# Patient Record
Sex: FEMALE | Race: WHITE | Employment: OTHER | URBAN - METROPOLITAN AREA
[De-identification: names, ages, dates, MRNs, and addresses within clinical notes are randomized per-mention and may not be internally consistent; named-entity substitution may affect disease eponyms.]

---

## 2017-01-26 ENCOUNTER — PATIENT OUTREACH (OUTPATIENT)
Dept: FAMILY MEDICINE CLINIC | Facility: CLINIC | Age: 72
End: 2017-01-26

## 2017-03-01 RX ORDER — ATORVASTATIN CALCIUM 10 MG/1
TABLET, FILM COATED ORAL
Qty: 30 TABLET | Refills: 0 | Status: SHIPPED | OUTPATIENT
Start: 2017-03-01 | End: 2017-03-17

## 2017-03-01 NOTE — TELEPHONE ENCOUNTER
30 days of the Atorvastatin approved as pt was to come back in 6 months and that was Feb. 2017. Please call patient and have her schedule 6 month follow up with Dr. Charlette Pruitt for further refills.   thanks

## 2017-03-02 NOTE — TELEPHONE ENCOUNTER
Pt made an appt for 3/17/17 for a med refill. She was in [place] as to why she did not come in in February.

## 2017-03-17 ENCOUNTER — OFFICE VISIT (OUTPATIENT)
Dept: FAMILY MEDICINE CLINIC | Facility: CLINIC | Age: 72
End: 2017-03-17

## 2017-03-17 VITALS
WEIGHT: 149 LBS | HEIGHT: 64 IN | DIASTOLIC BLOOD PRESSURE: 74 MMHG | RESPIRATION RATE: 16 BRPM | HEART RATE: 60 BPM | BODY MASS INDEX: 25.44 KG/M2 | SYSTOLIC BLOOD PRESSURE: 110 MMHG

## 2017-03-17 DIAGNOSIS — H90.12 CONDUCTIVE HEARING LOSS OF LEFT EAR, UNSPECIFIED HEARING STATUS ON CONTRALATERAL SIDE: ICD-10-CM

## 2017-03-17 DIAGNOSIS — H90.3 SENSORY HEARING LOSS, BILATERAL: ICD-10-CM

## 2017-03-17 DIAGNOSIS — E78.00 HYPERCHOLESTEROLEMIA: Primary | ICD-10-CM

## 2017-03-17 DIAGNOSIS — Z12.31 ENCOUNTER FOR SCREENING MAMMOGRAM FOR MALIGNANT NEOPLASM OF BREAST: ICD-10-CM

## 2017-03-17 DIAGNOSIS — Z00.00 ENCOUNTER FOR MEDICARE ANNUAL WELLNESS EXAM: ICD-10-CM

## 2017-03-17 DIAGNOSIS — Z79.899 ENCOUNTER FOR LONG-TERM CURRENT USE OF MEDICATION: ICD-10-CM

## 2017-03-17 DIAGNOSIS — M81.0 SENILE OSTEOPOROSIS: ICD-10-CM

## 2017-03-17 DIAGNOSIS — F51.01 PRIMARY INSOMNIA: ICD-10-CM

## 2017-03-17 DIAGNOSIS — K57.30 DIVERTICULOSIS OF LARGE INTESTINE WITHOUT HEMORRHAGE: ICD-10-CM

## 2017-03-17 PROCEDURE — G0439 PPPS, SUBSEQ VISIT: HCPCS | Performed by: FAMILY MEDICINE

## 2017-03-17 PROCEDURE — 96160 PT-FOCUSED HLTH RISK ASSMT: CPT | Performed by: FAMILY MEDICINE

## 2017-03-17 RX ORDER — ATORVASTATIN CALCIUM 10 MG/1
10 TABLET, FILM COATED ORAL NIGHTLY
Qty: 90 TABLET | Refills: 1 | Status: SHIPPED | OUTPATIENT
Start: 2017-03-17 | End: 2017-09-14

## 2017-03-17 RX ORDER — ZOLPIDEM TARTRATE 10 MG/1
TABLET ORAL
Qty: 90 TABLET | Refills: 1 | Status: SHIPPED | OUTPATIENT
Start: 2017-03-17 | End: 2017-09-14

## 2017-03-17 NOTE — PROGRESS NOTES
Review of Systems  Physical Exam      HPI:   Darrel Martinez is a 66year old female who presents for a MA (Medicare Advantage) [facility] (Once per calendar year).            Patient Care Team: Patient Care Team:  Chase Caraballo DO as PCP - General ( Barotrauma, otic, initial encounter (4/11/2016); Sensory hearing loss, bilateral (4/11/2016); Primary insomnia (8/6/2016); Conductive hearing loss of left ear (3/17/2017); and Diverticulosis of large intestine without hemorrhage (3/17/2017).     She  has pa appearance: alert, appears stated age and cooperative  Head: Normocephalic, without obvious abnormality, atraumatic  Eyes: EOMI, PERRLA, normal conjunctiva  Throat: Moist mucous membranes, normal posterior oropharynx  Neck: no adenopathy, no JVD, supple, s Cholesterol Calc      <130 mg/dL 96 82   VLDL      5-40 mg/dL 19 14   T. CHOL/HDL RATIO      <4.44 3.05 2.57   NON HDL CHOL      <130 mg/dL 115 96         ASSESSMENT AND OTHER RELEVANT CHRONIC CONDITIONS:   Saurabh Jones is a 66year old female who pres Encounter for screening mammogram for malignant neoplasm of breast  Screening mammogram due around June 30, 2017.    9. Encounter for Medicare annual wellness exam  Patient provided handouts on women's health and prevention and living will.         Meds & R (around 9/17/2017) for Chronic Conditions and as needed.      Tressa Aragon DO, 3/17/2017     General Health     In the past six months, have you lost more than 10 pounds without trying?: 2 - No    Has your appetite been poor?: No    How does the patient two weeks)?: Not at all    Feeling down, depressed, or hopeless (over the last two weeks)?: Not at all    PHQ-2 SCORE: 0        Advance Directives     Do you have a healthcare power of ?: Yes    Do you have a living will?: Yes     Please go to [facility] Health Maintenance if applicable    Chlamydia  Annually if high risk No results found for: CHLAMYDIA No flowsheet data found.     Screening Mammogram      Mammogram Annually to 75, then as discussed Mammogram,1 Yr due on 06/30/2017 Update Health Maintenance

## 2017-03-17 NOTE — PATIENT INSTRUCTIONS
Routine Healthcare for Women   Routine checkups can find treatable problems early. For many medical problems, early treatment can help prevent more serious complications. The value of checkups and how often you have them depend mainly on your age.  Your per history of high cholesterol. Colorectal cancer test: if you are 48 or older.  Recommended tests include a yearly test for blood in the stool, called the fecal occult blood test (FOBT) or fecal immunochemical test (FIT), and one of the following tests:   s history. Many other tests are often done at routine checkups, but there is no current evidence that they are helpful as routine screening tests for healthy women. Examples of such tests are a CBC (complete blood count), thyroid tests, and urine tests.  Wh medical condition, such as diabetes. Varicella (chickenpox) if you have never had chickenpox. Zoster (shingles) vaccine: if you are 60 or older. The vaccine can help prevent shingles. It can also reduce the pain caused by shingles.    What other things Others have calcium added (fortified). It's best to get calcium from the foods you eat. But if you can't get enough, you may want to take calcium supplements. To meet your daily calcium needs, try the foods listed below.   Dairy Fish & beans Other sources Public Health. This site has a lot of good information including definitions of the different types of Advance Directives.  It also has the State forms available on it's website for anyone to review and print using their home computer and printer. (the form

## 2017-03-26 DIAGNOSIS — E78.00 HYPERCHOLESTEROLEMIA: ICD-10-CM

## 2017-03-27 RX ORDER — ATORVASTATIN CALCIUM 10 MG/1
10 TABLET, FILM COATED ORAL NIGHTLY
Qty: 90 TABLET | Refills: 1 | OUTPATIENT
Start: 2017-03-27

## 2017-03-27 NOTE — TELEPHONE ENCOUNTER
From: Jarrett Palumbo  To:  Kisha Anna DO  Sent: 3/26/2017 6:50 AM CDT  Subject: Medication Renewal Request    Original authorizing provider: DO Jarrett Malin would like a refill of the following medications:  Atorvastatin [place]

## 2017-04-03 RX ORDER — ZOLPIDEM TARTRATE 10 MG/1
TABLET ORAL
Qty: 90 TABLET | Refills: 0 | OUTPATIENT
Start: 2017-04-03

## 2017-07-25 ENCOUNTER — TELEPHONE (OUTPATIENT)
Dept: FAMILY MEDICINE CLINIC | Facility: CLINIC | Age: 72
End: 2017-07-25

## 2017-07-25 DIAGNOSIS — E78.00 HYPERCHOLESTEROLEMIA: Primary | ICD-10-CM

## 2017-07-25 NOTE — TELEPHONE ENCOUNTER
Doc, pt went to the lab today wanting to have blood work completed, there are no labs pending for her in the system. The patient was here 3/17/17 for her supervisit, she had blood work done July 2016. Please advise on any orders.

## 2017-07-26 NOTE — TELEPHONE ENCOUNTER
Pt informed that the labs are in the system and she can have them completed at her earliest convenience. Pt states that she will wait a while on having labs as she marroquin snot do will with fasting.

## 2017-09-10 DIAGNOSIS — E78.00 HYPERCHOLESTEROLEMIA: ICD-10-CM

## 2017-09-11 RX ORDER — ATORVASTATIN CALCIUM 10 MG/1
TABLET, FILM COATED ORAL
Qty: 90 TABLET | Refills: 0 | OUTPATIENT
Start: 2017-09-11

## 2017-09-14 ENCOUNTER — TELEPHONE (OUTPATIENT)
Dept: FAMILY MEDICINE CLINIC | Facility: CLINIC | Age: 72
End: 2017-09-14

## 2017-09-14 DIAGNOSIS — F51.01 PRIMARY INSOMNIA: ICD-10-CM

## 2017-09-14 DIAGNOSIS — E78.00 HYPERCHOLESTEROLEMIA: ICD-10-CM

## 2017-09-14 RX ORDER — ATORVASTATIN CALCIUM 10 MG/1
10 TABLET, FILM COATED ORAL NIGHTLY
Qty: 30 TABLET | Refills: 0 | Status: SHIPPED | OUTPATIENT
Start: 2017-09-14 | End: 2017-09-28

## 2017-09-14 RX ORDER — ATORVASTATIN CALCIUM 10 MG/1
10 TABLET, FILM COATED ORAL NIGHTLY
Qty: 90 TABLET | Refills: 1
Start: 2017-09-14

## 2017-09-14 RX ORDER — ZOLPIDEM TARTRATE 10 MG/1
TABLET ORAL
Qty: 90 TABLET | Refills: 1
Start: 2017-09-14

## 2017-09-14 NOTE — TELEPHONE ENCOUNTER
Pt is due for an office visit last seen 3/2017. Will approve 30 day refill.   Spoke to patient's  and he will discuss with patient and have her call office to schedule appt

## 2017-09-14 NOTE — TELEPHONE ENCOUNTER
The Zolpidem script is pending in a separate TE for Dr. Anel Escobar. It will be for 30 days once it is approved. The Atorvastatin was sent thru this TE for 30 days.

## 2017-09-14 NOTE — TELEPHONE ENCOUNTER
Pt called and said she will be out of her meds by the time her appt comes around on 9/28/17 @11:30, she wants to know if she can get enough to hold her over until her appt. She needs Atorvastatin and Zolpidum.

## 2017-09-15 RX ORDER — ZOLPIDEM TARTRATE 10 MG/1
TABLET ORAL
Qty: 30 TABLET | Refills: 0 | Status: SHIPPED | OUTPATIENT
Start: 2017-09-15 | End: 2017-09-28

## 2017-09-18 ENCOUNTER — TELEPHONE (OUTPATIENT)
Dept: FAMILY MEDICINE CLINIC | Facility: CLINIC | Age: 72
End: 2017-09-18

## 2017-09-18 NOTE — TELEPHONE ENCOUNTER
Ricky Pelletier is calling regarding her ZOLPIDEM TARTRATE 10 MG Oral Tab the pharmacy instructed her to call the office.  Please call her at [phone]

## 2017-09-21 NOTE — TELEPHONE ENCOUNTER
Called pharmacy to see if med was approved and [facility] said that patient already picked up the script yesterday and only had to pay $5.38    Letter received from [facility] that zolpidem was approved from 7/16/17 thru 3/28/9673  Cert.  Number  [id number]

## 2017-09-28 ENCOUNTER — APPOINTMENT (OUTPATIENT)
Dept: LAB | Age: 72
End: 2017-09-28
Attending: FAMILY MEDICINE
Payer: MEDICARE

## 2017-09-28 ENCOUNTER — OFFICE VISIT (OUTPATIENT)
Dept: FAMILY MEDICINE CLINIC | Facility: CLINIC | Age: 72
End: 2017-09-28

## 2017-09-28 VITALS
DIASTOLIC BLOOD PRESSURE: 68 MMHG | WEIGHT: 149.38 LBS | RESPIRATION RATE: 16 BRPM | BODY MASS INDEX: 25.5 KG/M2 | SYSTOLIC BLOOD PRESSURE: 112 MMHG | HEIGHT: 64 IN | HEART RATE: 57 BPM

## 2017-09-28 DIAGNOSIS — F51.01 PRIMARY INSOMNIA: ICD-10-CM

## 2017-09-28 DIAGNOSIS — E78.00 HYPERCHOLESTEROLEMIA: Primary | ICD-10-CM

## 2017-09-28 DIAGNOSIS — E78.00 HYPERCHOLESTEROLEMIA: ICD-10-CM

## 2017-09-28 LAB
ALBUMIN SERPL-MCNC: 3.9 G/DL (ref 3.5–4.8)
ALP LIVER SERPL-CCNC: 98 U/L (ref 55–142)
ALT SERPL-CCNC: 21 U/L (ref 14–54)
AST SERPL-CCNC: 21 U/L (ref 15–41)
BILIRUB SERPL-MCNC: 0.8 MG/DL (ref 0.1–2)
BUN BLD-MCNC: 19 MG/DL (ref 8–20)
CALCIUM BLD-MCNC: 9.3 MG/DL (ref 8.3–10.3)
CHLORIDE: 107 MMOL/L (ref 101–111)
CHOLEST SMN-MCNC: 161 MG/DL (ref ?–200)
CO2: 27 MMOL/L (ref 22–32)
CREAT BLD-MCNC: 0.84 MG/DL (ref 0.55–1.02)
GLUCOSE BLD-MCNC: 89 MG/DL (ref 70–99)
HDLC SERPL-MCNC: 58 MG/DL (ref 45–?)
HDLC SERPL: 2.78 {RATIO} (ref ?–4.44)
LDLC SERPL CALC-MCNC: 86 MG/DL (ref ?–130)
LDLC SERPL-MCNC: 17 MG/DL (ref 5–40)
M PROTEIN MFR SERPL ELPH: 7.4 G/DL (ref 6.1–8.3)
NONHDLC SERPL-MCNC: 103 MG/DL (ref ?–130)
POTASSIUM SERPL-SCNC: 4.1 MMOL/L (ref 3.6–5.1)
SODIUM SERPL-SCNC: 141 MMOL/L (ref 136–144)
TRIGLYCERIDES: 83 MG/DL (ref ?–150)

## 2017-09-28 PROCEDURE — 80053 COMPREHEN METABOLIC PANEL: CPT | Performed by: FAMILY MEDICINE

## 2017-09-28 PROCEDURE — 80061 LIPID PANEL: CPT | Performed by: FAMILY MEDICINE

## 2017-09-28 PROCEDURE — 99213 OFFICE O/P EST LOW 20 MIN: CPT | Performed by: FAMILY MEDICINE

## 2017-09-28 PROCEDURE — 36415 COLL VENOUS BLD VENIPUNCTURE: CPT | Performed by: FAMILY MEDICINE

## 2017-09-28 RX ORDER — ATORVASTATIN CALCIUM 10 MG/1
10 TABLET, FILM COATED ORAL NIGHTLY
Qty: 90 TABLET | Refills: 1 | Status: SHIPPED | OUTPATIENT
Start: 2017-09-28 | End: 2018-04-27

## 2017-09-28 RX ORDER — LEVOCETIRIZINE DIHYDROCHLORIDE 5 MG/1
5 TABLET, FILM COATED ORAL EVERY EVENING
Qty: 30 TABLET | Refills: 3 | Status: CANCELLED | OUTPATIENT
Start: 2017-09-28

## 2017-09-28 RX ORDER — ZOLPIDEM TARTRATE 10 MG/1
10 TABLET ORAL NIGHTLY PRN
Qty: 90 TABLET | Refills: 1 | Status: SHIPPED | OUTPATIENT
Start: 2017-09-28 | End: 2018-04-05

## 2017-09-28 NOTE — PROGRESS NOTES
Yamilet Rodriguez is a 67year old female. HPI:     Hyperlipidemia: Taking atorvastatin 10 mg daily. Tolerating well. Denies side effects such as muscle aches or pains. States she eats healthfully. She exercises 2 hours 5 days a week.     Insomnia: P ENDOSCOPY PERFORMED      Comment: Dr. Katy Rios  No date: UPPER GI ENDOSCOPY,EXAM   Social History:    Smoking status: Never Smoker                                                              Smokeless tobacco: Never Used                      Alcohol use: communication  SKIN: no visible rashes  HEAD: NCAT  NECK: supple, no adenopathy, no thyromegaly, no masses  LUNGS: CTA A/P no wheezes/ronchi/rales/crackles  CARDIO: RRR, +S1/S2, no mm/S3/S4  VASCULAR: Radial pulses 2+ b/l.  no edema  GI: normal bowel sound DAY AT BEDTIME AS NEEDED  Dispense: 90 tablet; Refill: 2        Medication use, risks, benefits, side effects and precautions discussed, patient verbalizes understanding. Questions encouraged and answered to patient's satisfaction.           HPI    Review o

## 2017-10-15 DIAGNOSIS — E78.00 HYPERCHOLESTEROLEMIA: ICD-10-CM

## 2017-10-16 RX ORDER — ATORVASTATIN CALCIUM 10 MG/1
TABLET, FILM COATED ORAL
Qty: 30 TABLET | Refills: 0 | OUTPATIENT
Start: 2017-10-16

## 2017-10-29 DIAGNOSIS — F51.01 PRIMARY INSOMNIA: ICD-10-CM

## 2017-10-30 RX ORDER — ZOLPIDEM TARTRATE 10 MG/1
TABLET ORAL
Qty: 30 TABLET | Refills: 0 | OUTPATIENT
Start: 2017-10-30

## 2018-01-17 ENCOUNTER — TELEPHONE (OUTPATIENT)
Dept: FAMILY MEDICINE CLINIC | Facility: CLINIC | Age: 73
End: 2018-01-17

## 2018-03-23 DIAGNOSIS — E78.00 HYPERCHOLESTEROLEMIA: ICD-10-CM

## 2018-03-23 RX ORDER — ATORVASTATIN CALCIUM 10 MG/1
TABLET, FILM COATED ORAL
Qty: 90 TABLET | Refills: 0 | OUTPATIENT
Start: 2018-03-23

## 2018-04-05 DIAGNOSIS — F51.01 PRIMARY INSOMNIA: ICD-10-CM

## 2018-04-05 NOTE — TELEPHONE ENCOUNTER
Faxed refill request received from pharmacy for zolpidem 10 mg  Last rx 9/28/17 qty 90 + 1 refill  Future Appointments  Date Time Provider Taylor Watson   4/27/2018 11:00 AM Kimberli Murillo,  [facility] [place]     Please advise refill

## 2018-04-07 RX ORDER — ZOLPIDEM TARTRATE 10 MG/1
10 TABLET ORAL NIGHTLY PRN
Qty: 90 TABLET | Refills: 0 | Status: SHIPPED | OUTPATIENT
Start: 2018-04-07 | End: 2018-04-27

## 2018-04-26 ENCOUNTER — TELEPHONE (OUTPATIENT)
Dept: FAMILY MEDICINE CLINIC | Facility: CLINIC | Age: 73
End: 2018-04-26

## 2018-04-26 ENCOUNTER — PATIENT MESSAGE (OUTPATIENT)
Dept: FAMILY MEDICINE CLINIC | Facility: CLINIC | Age: 73
End: 2018-04-26

## 2018-04-26 DIAGNOSIS — E78.00 HYPERCHOLESTEROLEMIA: Primary | ICD-10-CM

## 2018-04-26 NOTE — TELEPHONE ENCOUNTER
Pt called and said she is having a Supervisit tomorrow and would like to get her labs done 1st thing in the morning prior to her appt. She would like a call back when the orders have been placed.

## 2018-04-26 NOTE — TELEPHONE ENCOUNTER
Dr Chetna Reyes, please advise lab orders.  Patient is insisting on having them drawn before her appt

## 2018-04-26 NOTE — TELEPHONE ENCOUNTER
From: Trice Nugent  To: Omkar Hawkins DO  Sent: 4/26/2018 7:02 AM CDT  Subject: Other    Dr. Ksenia Murray,  I have an appointment for a Super Visit tomorrow which includes blood work.  I will go to your office for the blood draw, but want to make sure

## 2018-04-26 NOTE — TELEPHONE ENCOUNTER
Dr Shae Owen advise lab orders.  Patient is insisting on having her labs drawn before her appt tomorrow at 11:00am.

## 2018-04-27 ENCOUNTER — APPOINTMENT (OUTPATIENT)
Dept: LAB | Age: 73
End: 2018-04-27
Attending: FAMILY MEDICINE
Payer: MEDICARE

## 2018-04-27 ENCOUNTER — OFFICE VISIT (OUTPATIENT)
Dept: FAMILY MEDICINE CLINIC | Facility: CLINIC | Age: 73
End: 2018-04-27

## 2018-04-27 VITALS
SYSTOLIC BLOOD PRESSURE: 116 MMHG | WEIGHT: 149.19 LBS | DIASTOLIC BLOOD PRESSURE: 76 MMHG | BODY MASS INDEX: 26.11 KG/M2 | HEIGHT: 63.5 IN | HEART RATE: 68 BPM

## 2018-04-27 DIAGNOSIS — F51.01 PRIMARY INSOMNIA: ICD-10-CM

## 2018-04-27 DIAGNOSIS — K22.10 EROSIVE ESOPHAGITIS: ICD-10-CM

## 2018-04-27 DIAGNOSIS — E78.00 HYPERCHOLESTEROLEMIA: ICD-10-CM

## 2018-04-27 DIAGNOSIS — Z92.29 HISTORY OF BISPHOSPHONATE THERAPY: ICD-10-CM

## 2018-04-27 DIAGNOSIS — Z78.0 POSTMENOPAUSAL: ICD-10-CM

## 2018-04-27 DIAGNOSIS — Z00.00 MEDICARE ANNUAL WELLNESS VISIT, SUBSEQUENT: Primary | ICD-10-CM

## 2018-04-27 DIAGNOSIS — Z12.31 ENCOUNTER FOR SCREENING MAMMOGRAM FOR MALIGNANT NEOPLASM OF BREAST: ICD-10-CM

## 2018-04-27 DIAGNOSIS — Z79.899 ENCOUNTER FOR LONG-TERM CURRENT USE OF MEDICATION: ICD-10-CM

## 2018-04-27 DIAGNOSIS — M81.0 SENILE OSTEOPOROSIS: ICD-10-CM

## 2018-04-27 DIAGNOSIS — Z86.010 HISTORY OF COLON POLYPS: ICD-10-CM

## 2018-04-27 DIAGNOSIS — H90.3 SENSORY HEARING LOSS, BILATERAL: ICD-10-CM

## 2018-04-27 DIAGNOSIS — F13.282 SEDATIVE, HYPNOTIC OR ANXIOLYTIC DEPENDENCE W SLEEP DISORDER (HCC): ICD-10-CM

## 2018-04-27 DIAGNOSIS — J30.89 PERENNIAL ALLERGIC RHINITIS: ICD-10-CM

## 2018-04-27 PROCEDURE — G0439 PPPS, SUBSEQ VISIT: HCPCS | Performed by: FAMILY MEDICINE

## 2018-04-27 PROCEDURE — 80053 COMPREHEN METABOLIC PANEL: CPT | Performed by: FAMILY MEDICINE

## 2018-04-27 PROCEDURE — 36415 COLL VENOUS BLD VENIPUNCTURE: CPT | Performed by: FAMILY MEDICINE

## 2018-04-27 PROCEDURE — 96160 PT-FOCUSED HLTH RISK ASSMT: CPT | Performed by: FAMILY MEDICINE

## 2018-04-27 PROCEDURE — 80061 LIPID PANEL: CPT | Performed by: FAMILY MEDICINE

## 2018-04-27 RX ORDER — ZOLPIDEM TARTRATE 10 MG/1
10 TABLET ORAL NIGHTLY PRN
Qty: 90 TABLET | Refills: 0 | Status: SHIPPED | OUTPATIENT
Start: 2018-07-07 | End: 2018-10-20

## 2018-04-27 RX ORDER — ATORVASTATIN CALCIUM 10 MG/1
10 TABLET, FILM COATED ORAL NIGHTLY
Qty: 90 TABLET | Refills: 3 | Status: SHIPPED | OUTPATIENT
Start: 2018-04-27 | End: 2019-04-10

## 2018-04-27 RX ORDER — LEVOCETIRIZINE DIHYDROCHLORIDE 5 MG/1
5 TABLET, FILM COATED ORAL EVERY EVENING
Qty: 90 TABLET | Refills: 3 | Status: SHIPPED | OUTPATIENT
Start: 2018-04-27 | End: 2019-05-24

## 2018-04-27 NOTE — PATIENT INSTRUCTIONS
For flying to help with ears, recommend try Ear Planes. Understanding Nasal Anatomy: Inside View  A lot happens under the surface of the nose. The bone and cartilage under the skin give the nose most of its size and shape.  Other structures i allergens include house dust mites, mold, cockroaches, and pet dander. Outdoor allergens include pollen from trees, grasses, and weeds.   Symptoms include a drippy, stuffy, and itchy nose. They also include sneezing and red and itchy eyes.  You may feel tir provider or our staff. If you were referred to an allergy specialist, make this appointment promptly.   When to seek medical advice  Call your healthcare provider right away if the following occur:  · Coughing or wheezing  · Fever of 100.4°F (38°C) or highe can plug the nasal passages or drip out of the nose. Mucus can drip down the back of the throat (postnasal drip) as well. Sinus tissue can swell. This may cause pain and headache.  Common allergy symptoms include:  · Runny nose with clear, watery discharge

## 2018-04-27 NOTE — PROGRESS NOTES
HPI:   Alex Hurd is a 67year old female who presents for a MA (Medicare Advantage) [facility] (Once per calendar year).            Fall/Risk Assessment   She has been screened for Falls and is low risk: Fall/Risk Scorin    Cognitive Assessment 04/27/2018   TRIG 73 04/27/2018          Last Chemistry Labs:     Lab Results  Component Value Date   AST 29 04/27/2018   ALT 31 04/27/2018   CA 9.5 04/27/2018   ALB 3.8 04/27/2018   TSH 1.580 04/24/2013   CREATSERUM 0.82 04/27/2018   GLU 89 04/27/2018 colonoscopy & polypectomy (12/17/14); and upper gi endoscopy,exam.    Her family history includes ALS in her mother; Breast Cancer in her maternal aunt and paternal aunt;  Heart Attack in her maternal grandfather, paternal grandfather, and paternal grandmot symmetrical, trachea midline and thyroid not enlarged, symmetric, no tenderness/mass/nodules  Lungs: clear to auscultation bilaterally  Breasts:  normal appearance, no masses or tenderness, Inspection negative, No nipple retraction or dimpling, No nipple d disorder [facility])  Patient dependent on zolpidem for sleep/insomnia. Patient has been on 10 mg of zolpidem nightly for years.     3. Hypercholesterolemia  Well maintained on atorvastatin 10 mg daily which she will continue.    - atorvastatin 10 MG Oral Tab; T by mouth nightly as needed for Sleep. atorvastatin 10 MG Oral Tab 90 tablet 3      Sig: Take 1 tablet (10 mg total) by mouth nightly.       Levocetirizine Dihydrochloride 5 MG Oral Tab 90 tablet 3      Sig: Take 1 tablet (5 mg total) by mouth every doron Colonoscopy Screen every 10 years Colonoscopy,3 Years due on 12/17/2017 Update Health Maintenance if applicable    Flex Sigmoidoscopy Screen every 10 years No results found for this or any previous visit. No flowsheet data found.      Fecal Occult Blood Rosalia Robles needed    Zoster  Not covered by Medicare Part B No vaccine history found This may be covered with your pharmacy  prescription benefits                    Template: [address] [[id number]]

## 2018-04-29 PROBLEM — Z86.010 HISTORY OF COLON POLYPS: Status: ACTIVE | Noted: 2018-04-29

## 2018-04-29 PROBLEM — Z92.29 HISTORY OF BISPHOSPHONATE THERAPY: Status: ACTIVE | Noted: 2018-04-29

## 2018-04-29 PROBLEM — Z78.0 POSTMENOPAUSAL: Status: ACTIVE | Noted: 2018-04-29

## 2018-04-29 PROBLEM — J30.89 PERENNIAL ALLERGIC RHINITIS: Status: ACTIVE | Noted: 2018-04-29

## 2018-04-29 PROBLEM — F13.282 SEDATIVE, HYPNOTIC OR ANXIOLYTIC DEPENDENCE W SLEEP DISORDER (HCC): Status: ACTIVE | Noted: 2018-04-29

## 2018-04-29 PROBLEM — K57.30 DIVERTICULOSIS OF LARGE INTESTINE WITHOUT HEMORRHAGE: Status: RESOLVED | Noted: 2017-03-17 | Resolved: 2018-04-29

## 2018-04-29 PROBLEM — K22.10 EROSIVE ESOPHAGITIS: Status: ACTIVE | Noted: 2018-04-29

## 2018-04-29 PROBLEM — H90.12 CONDUCTIVE HEARING LOSS OF LEFT EAR: Status: RESOLVED | Noted: 2017-03-17 | Resolved: 2018-04-29

## 2018-06-21 ENCOUNTER — PATIENT MESSAGE (OUTPATIENT)
Dept: FAMILY MEDICINE CLINIC | Facility: CLINIC | Age: 73
End: 2018-06-21

## 2018-06-21 DIAGNOSIS — Z12.11 ENCOUNTER FOR SCREENING COLONOSCOPY: ICD-10-CM

## 2018-06-21 DIAGNOSIS — Z86.010 HISTORY OF COLON POLYPS: Primary | ICD-10-CM

## 2018-06-21 DIAGNOSIS — K22.10 EROSIVE ESOPHAGITIS: ICD-10-CM

## 2018-06-21 NOTE — TELEPHONE ENCOUNTER
----- Message from [id number] Negron sent at 6/21/2018 10:21 AM CDT -----  Regarding: Visit Follow-up Question  Contact: [phone]  The colonoscopy appt with Carine Win did not work out.   He has had a change of office and the timing to use an affiliated f

## 2018-07-02 ENCOUNTER — HOSPITAL ENCOUNTER (OUTPATIENT)
Dept: MAMMOGRAPHY | Age: 73
Discharge: HOME | End: 2018-07-02
Attending: FAMILY MEDICINE
Payer: MEDICARE

## 2018-07-02 ENCOUNTER — APPOINTMENT (OUTPATIENT)
Dept: MAMMOGRAPHY | Age: 73
End: 2018-07-02
Attending: FAMILY MEDICINE
Payer: MEDICARE

## 2018-07-02 ENCOUNTER — HOSPITAL ENCOUNTER (OUTPATIENT)
Dept: BONE DENSITY | Age: 73
Discharge: HOME | End: 2018-07-02
Attending: FAMILY MEDICINE
Payer: MEDICARE

## 2018-07-02 DIAGNOSIS — Z78.0 POSTMENOPAUSAL: ICD-10-CM

## 2018-07-02 DIAGNOSIS — Z12.31 ENCOUNTER FOR SCREENING MAMMOGRAM FOR MALIGNANT NEOPLASM OF BREAST: ICD-10-CM

## 2018-07-02 PROCEDURE — 77063 BREAST TOMOSYNTHESIS BI: CPT | Performed by: FAMILY MEDICINE

## 2018-07-02 PROCEDURE — 77080 DXA BONE DENSITY AXIAL: CPT | Performed by: FAMILY MEDICINE

## 2018-07-02 PROCEDURE — 77067 SCR MAMMO BI INCL CAD: CPT | Performed by: FAMILY MEDICINE

## 2018-07-05 ENCOUNTER — TELEPHONE (OUTPATIENT)
Dept: FAMILY MEDICINE CLINIC | Facility: CLINIC | Age: 73
End: 2018-07-05

## 2018-07-05 DIAGNOSIS — M81.0 SENILE OSTEOPOROSIS: Primary | ICD-10-CM

## 2018-07-05 NOTE — TELEPHONE ENCOUNTER
----- Message from Oleg Barroso DO sent at 7/4/2018  8:26 PM CDT -----  Please call patient: Osteoporosis worsening. She is already been treated with a bisphosphonate in the past.  Recommend consult endocrinologist, Dr. Mendel Downy.

## 2018-09-07 ENCOUNTER — OFFICE VISIT (OUTPATIENT)
Dept: ENDOCRINOLOGY CLINIC | Facility: CLINIC | Age: 73
End: 2018-09-07
Payer: MEDICARE

## 2018-09-07 VITALS
BODY MASS INDEX: 26 KG/M2 | DIASTOLIC BLOOD PRESSURE: 75 MMHG | WEIGHT: 152.19 LBS | SYSTOLIC BLOOD PRESSURE: 120 MMHG | HEART RATE: 65 BPM

## 2018-09-07 DIAGNOSIS — M81.0 SENILE OSTEOPOROSIS: Primary | ICD-10-CM

## 2018-09-07 PROCEDURE — 99203 OFFICE O/P NEW LOW 30 MIN: CPT | Performed by: INTERNAL MEDICINE

## 2018-09-07 PROCEDURE — G0463 HOSPITAL OUTPT CLINIC VISIT: HCPCS | Performed by: INTERNAL MEDICINE

## 2018-09-07 NOTE — PROGRESS NOTES
Name: Dmitri Becerril  Date: 9/7/2018    Referring Physician: No ref.  provider found    Patient presents with:  Osteoporosis      HISTORY OF PRESENT ILLNESS   Dmitri Becerril is a 75year old female who presents for Patient presents with:  Osteoporosis Calcium Carb-Cholecalciferol (CALCIUM 600 + D) 600-200 MG-UNIT Oral Tab, Take 1 tablet by mouth 2 (two) times daily. , Disp: , Rfl:      Allergies:   No Known Allergies    Social History:   Social History    Marital status:              Spouse name: with swallowing 5 years ago   • Sedative, hypnotic or anxiolytic dependence w sleep disorder ([facility] [address] 4/29/2018   • Senile osteoporosis 8/19/2014   • Sensory hearing loss, bilateral 4/11/2016   • Sessile colonic polyp 12/17/14    10mm transverse colon   • Wea Alex Sen MD

## 2018-09-07 NOTE — PATIENT INSTRUCTIONS
24 hour urine collection: first urination in the morning flush, collect all the urine for the rest of the day including the first urination the next day    Forteo Pen Injection:    Inject forteo once daily each morning.     - pen to be kept in the refriger

## 2018-09-08 ENCOUNTER — APPOINTMENT (OUTPATIENT)
Dept: LAB | Age: 73
End: 2018-09-08
Attending: INTERNAL MEDICINE
Payer: MEDICARE

## 2018-09-08 DIAGNOSIS — M81.0 SENILE OSTEOPOROSIS: ICD-10-CM

## 2018-09-08 LAB
PTH-INTACT SERPL-MCNC: 49.5 PG/ML (ref 11.1–79.5)
VIT D+METAB SERPL-MCNC: 43 NG/ML (ref 30–100)

## 2018-09-08 PROCEDURE — 82306 VITAMIN D 25 HYDROXY: CPT

## 2018-09-08 PROCEDURE — 84080 ASSAY ALKALINE PHOSPHATASES: CPT

## 2018-09-08 PROCEDURE — 36415 COLL VENOUS BLD VENIPUNCTURE: CPT

## 2018-09-08 PROCEDURE — 83970 ASSAY OF PARATHORMONE: CPT

## 2018-09-10 ENCOUNTER — APPOINTMENT (OUTPATIENT)
Dept: LAB | Age: 73
End: 2018-09-10
Attending: INTERNAL MEDICINE
Payer: MEDICARE

## 2018-09-10 DIAGNOSIS — M81.0 SENILE OSTEOPOROSIS: ICD-10-CM

## 2018-09-10 LAB
BONE SPECIFIC ALKALINE PHOSPHATASE: 16.4 UG/L
CALCIUM 24H UR-SRATE: 189 MG/24HR (ref 42–353)
CREAT UR-SCNC: 0.81 G/24 HR (ref 0.6–1.8)
SODIUM SERPL-SCNC: 155 MEQ/24HR (ref 40–220)
SPECIMEN VOL UR: 1450 ML

## 2018-09-10 PROCEDURE — 82340 ASSAY OF CALCIUM IN URINE: CPT

## 2018-09-10 PROCEDURE — 82570 ASSAY OF URINE CREATININE: CPT

## 2018-09-10 PROCEDURE — 84300 ASSAY OF URINE SODIUM: CPT

## 2018-09-12 ENCOUNTER — TELEPHONE (OUTPATIENT)
Dept: ENDOCRINOLOGY CLINIC | Facility: CLINIC | Age: 73
End: 2018-09-12

## 2018-09-12 NOTE — TELEPHONE ENCOUNTER
Spoke with patient and discussed note below from provider. Pt verbalized understanding to start Forteo therapy. Ordered as written.

## 2018-09-12 NOTE — TELEPHONE ENCOUNTER
Please call patient - labs do demonstrate an increased rate of bone loss. However Vitamin D level is normal and her urine collection was normal.  Therefore lets proceed with forteo therapy as discussed at [address] Thanks.

## 2018-09-13 ENCOUNTER — TELEPHONE (OUTPATIENT)
Dept: ENDOCRINOLOGY CLINIC | Facility: CLINIC | Age: 73
End: 2018-09-13

## 2018-09-13 NOTE — TELEPHONE ENCOUNTER
Dr Yung Roy - please advise if pt should proceed with Forteo? Pt states foreo is approved but cost $1000/mo. RN assumed pt is in [facility] and explained briefly to pt. Pt states she is willing to proceed if medication will work for her.     Pt has question

## 2018-09-14 NOTE — TELEPHONE ENCOUNTER
Patient returned call. Discussed below. Plan per patient and [address] as discussed patient will start one month free in December and then when no longer in coverage gap in January will continue.

## 2018-09-26 ENCOUNTER — TELEPHONE (OUTPATIENT)
Dept: ENDOCRINOLOGY CLINIC | Facility: CLINIC | Age: 73
End: 2018-09-26

## 2018-09-26 NOTE — TELEPHONE ENCOUNTER
Pt requesting copies of images from Dexa Scan. Pls mail to:    [address] [facility], [address] [facility]    Also wanted to know how soon should she see for improvement while on Forteo. For add'l questions pls call. Thank you.

## 2018-09-26 NOTE — TELEPHONE ENCOUNTER
It usually takes at least 6-12 months to see a difference in the DEXA scan. The bone unfortunately takes a long time to show clinical  Change. Thanks.

## 2018-09-26 NOTE — TELEPHONE ENCOUNTER
Pt is returning call attempt to transfer with no answer [phone] states to please leave a message and is tired of playing phone tag

## 2018-09-26 NOTE — TELEPHONE ENCOUNTER
Spoke with patient. She will call [facility] to request images. Relayed SH message about forteo and when she would expect to see a difference in the DEXA scan. Patient verbalizes understanding.

## 2018-09-26 NOTE — TELEPHONE ENCOUNTER
Dr. Consuelo Woody please advise how long it will take patient to see results from forteo treatment. Will advise patient to contact [facility] for disk.

## 2018-10-20 DIAGNOSIS — F51.01 PRIMARY INSOMNIA: ICD-10-CM

## 2018-10-22 RX ORDER — ZOLPIDEM TARTRATE 10 MG/1
TABLET ORAL
Qty: 90 TABLET | Refills: 0 | Status: SHIPPED | OUTPATIENT
Start: 2018-10-22 | End: 2018-11-12

## 2018-10-24 ENCOUNTER — PATIENT MESSAGE (OUTPATIENT)
Dept: FAMILY MEDICINE CLINIC | Facility: CLINIC | Age: 73
End: 2018-10-24

## 2018-10-24 NOTE — TELEPHONE ENCOUNTER
Regarding: Prescription Question  Contact: [phone]  ----- Message from Generic, Allocadiamelinda sent at 10/24/2018  9:58 AM CDT -----    Please call me about the insurance issue for Zolpidem.   Sharonda Childress  [phone]

## 2018-10-24 NOTE — PROGRESS NOTES
Spoke to patient and she said that pharmacy told her to call us as she needs her Zolpidem approved?   Something to do with a quantity override as she may be allowed only so many pills within 365 days pt states that last time this happened Dr. Marylee Collie had to

## 2018-10-25 NOTE — TELEPHONE ENCOUNTER
Med was approved from 9/20/2018 thru 10/24/2019. Certification number: [id number]    Pharmacy notified via fax [phone] and pt as well.

## 2018-11-12 NOTE — PROGRESS NOTES
Clarence Olson is a 75year old female. HPI:       Insomnia:  Patient has been taking zolpidem nightly for at least 10 years. States she is unable to sleep without the medication. Tolerating well.     Denies side effects such as morning time jillian History of colon polyps 4/29/2018   • Hypercholesterolemia 11/19/2015   • Internal hemorrhoids 12/17/14    grade 1   • Leaking of urine 23 years ago   • Perennial allergic rhinitis 4/29/2018   • Postmenopausal 4/29/2018   • Primary insomnia 8/6/2016   • Pr 10/15/2018      Fluzone Vaccine Medicare ()                          10/13/2016      HEP A/HEP B Combined  01/30/2013 02/07/2013 02/27/2013      IPV                   06/28/2013      Pneumococcal (Prevnar 13)                          11/ tablet; Refill: 1    2. 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 Continue zolpidem 10 mg.   Consider trying to wean off in th

## 2018-11-13 NOTE — PATIENT INSTRUCTIONS
Insomnia  Insomnia is repeated difficulty going to sleep or staying asleep, or both. Whether you have insomnia is not defined by a specific amount of sleep.  Different people need different amounts of sleep, and you may need more or less sleep at [place] [address] Many different medidcines can affect your sleep, such as stimulants, caffeine, alcohol, some decongestants, and diet pills.  Other medicines may include some types of blood pressure pills, steroids, asthma medicines, antihistamines, antidepressants, seizure · Get regular exercise. Find other ways to lessen your stress level. · If a medicine was prescribed to help reset your sleep patterns, take it as directed. Sleeping pills are intended for short-term use, only.  If taken for too long, the effect wears off w

## 2018-12-26 ENCOUNTER — TELEPHONE (OUTPATIENT)
Dept: ENDOCRINOLOGY CLINIC | Facility: CLINIC | Age: 73
End: 2018-12-26

## 2018-12-26 NOTE — TELEPHONE ENCOUNTER
Current Outpatient Medications:  Teriparatide, Recombinant, (FORTEO) 600 MCG/2.4ML Subcutaneous Solution Inject 20 mcg into the skin daily.  Disp: 3 Syringe Rfl: 0     REFILL

## 2019-01-02 RX ORDER — PEN NEEDLE, DIABETIC 31 GX5/16"
NEEDLE, DISPOSABLE MISCELLANEOUS
Qty: 100 EACH | Refills: 1 | Status: SHIPPED | OUTPATIENT
Start: 2019-01-02 | End: 2019-10-20

## 2019-01-02 NOTE — TELEPHONE ENCOUNTER
Pt states she needs rx for ultrafine pen needles . Pt would like to know about interrupting injections.  States she is going to be on a trip for next 28 day supply

## 2019-01-02 NOTE — TELEPHONE ENCOUNTER
LOV 9/7/18    Pended 6 mo refill per [facility] protocol. Also, patient states she will be traveling. Ok to write letter stating need for supplies?

## 2019-01-04 ENCOUNTER — TELEPHONE (OUTPATIENT)
Dept: ENDOCRINOLOGY CLINIC | Facility: CLINIC | Age: 74
End: 2019-01-04

## 2019-01-04 NOTE — TELEPHONE ENCOUNTER
Pt indicates her ins does not cover rx:BD Pen Needles mini, pt indicates pharm sent fax request for a change in needles. Pls call pt at:[phone],thanks. *Devon/Pharm - Jose  *Pt indicates she has not p/up rx: Forteo until needles are sent, pt

## 2019-01-04 NOTE — TELEPHONE ENCOUNTER
LMTCB- will correct pen needles with pharmacy. From previous message I thought patient needed a letter for travel but will await callback to see what else is needed/clarified for her.

## 2019-01-07 NOTE — TELEPHONE ENCOUNTER
Returned call to patient. She states pen needle issue is resolved. She will be travelling to [facility] and will likely miss 4 days of injections and is asking if it is ok to miss 4 days.  Did discuss not advisable but she is going to be in a remote area

## 2019-01-08 NOTE — TELEPHONE ENCOUNTER
Pt verbalized understanding that it is ok to hold (not take) medication for 4 day while on her trip. RN discussed w/ pt.

## 2019-02-08 ENCOUNTER — TELEPHONE (OUTPATIENT)
Dept: FAMILY MEDICINE CLINIC | Facility: CLINIC | Age: 74
End: 2019-02-08

## 2019-04-10 ENCOUNTER — TELEPHONE (OUTPATIENT)
Dept: FAMILY MEDICINE CLINIC | Facility: CLINIC | Age: 74
End: 2019-04-10

## 2019-04-10 DIAGNOSIS — E78.00 HYPERCHOLESTEROLEMIA: ICD-10-CM

## 2019-04-10 RX ORDER — ATORVASTATIN CALCIUM 10 MG/1
TABLET, FILM COATED ORAL
Qty: 90 TABLET | Refills: 0 | Status: SHIPPED | OUTPATIENT
Start: 2019-04-10 | End: 2019-05-24

## 2019-05-08 DIAGNOSIS — E78.00 HYPERCHOLESTEROLEMIA: ICD-10-CM

## 2019-05-08 RX ORDER — ATORVASTATIN CALCIUM 10 MG/1
TABLET, FILM COATED ORAL
Qty: 90 TABLET | Refills: 0 | OUTPATIENT
Start: 2019-05-08

## 2019-05-21 DIAGNOSIS — F51.01 PRIMARY INSOMNIA: ICD-10-CM

## 2019-05-21 DIAGNOSIS — F13.282 SEDATIVE, HYPNOTIC OR ANXIOLYTIC DEPENDENCE W SLEEP DISORDER (HCC): ICD-10-CM

## 2019-05-21 DIAGNOSIS — E78.00 HYPERCHOLESTEROLEMIA: ICD-10-CM

## 2019-05-21 RX ORDER — LEVOCETIRIZINE DIHYDROCHLORIDE 5 MG/1
5 TABLET, FILM COATED ORAL EVERY EVENING
Qty: 90 TABLET | Refills: 0 | Status: CANCELLED | OUTPATIENT
Start: 2019-05-21

## 2019-05-21 RX ORDER — ATORVASTATIN CALCIUM 10 MG/1
TABLET, FILM COATED ORAL
Qty: 90 TABLET | Refills: 0 | Status: CANCELLED | OUTPATIENT
Start: 2019-05-21

## 2019-05-21 RX ORDER — ZOLPIDEM TARTRATE 10 MG/1
10 TABLET ORAL NIGHTLY
Qty: 90 TABLET | Refills: 0 | Status: CANCELLED | OUTPATIENT
Start: 2019-05-21

## 2019-05-21 NOTE — TELEPHONE ENCOUNTER
Pt was upset she needed to reschedule her appointments for 7/5 because after June 6th they will be out of town, pt is requesting a refill on ATORVASTATIN 10 MG Oral Tab, FORTEO 600 MCG/2.4ML Subcutaneous Solution, Zolpidem Tartrate 10 MG Oral Tab and Levoc

## 2019-05-21 NOTE — TELEPHONE ENCOUNTER
Spoke to patient and appt was rescheduled for Friday 5/24 at 9am ok'd per Dr. Kimberly Bateman.   Pt was very appreciative

## 2019-05-23 DIAGNOSIS — F13.282 SEDATIVE, HYPNOTIC OR ANXIOLYTIC DEPENDENCE W SLEEP DISORDER (HCC): ICD-10-CM

## 2019-05-23 DIAGNOSIS — F51.01 PRIMARY INSOMNIA: ICD-10-CM

## 2019-05-23 RX ORDER — ZOLPIDEM TARTRATE 10 MG/1
TABLET ORAL
Qty: 90 TABLET | Refills: 0 | OUTPATIENT
Start: 2019-05-23

## 2019-05-24 ENCOUNTER — OFFICE VISIT (OUTPATIENT)
Dept: FAMILY MEDICINE CLINIC | Facility: CLINIC | Age: 74
End: 2019-05-24
Payer: MEDICARE

## 2019-05-24 ENCOUNTER — LAB ENCOUNTER (OUTPATIENT)
Dept: LAB | Age: 74
End: 2019-05-24
Attending: FAMILY MEDICINE
Payer: MEDICARE

## 2019-05-24 VITALS
WEIGHT: 149 LBS | HEIGHT: 64 IN | DIASTOLIC BLOOD PRESSURE: 68 MMHG | SYSTOLIC BLOOD PRESSURE: 122 MMHG | BODY MASS INDEX: 25.44 KG/M2 | HEART RATE: 66 BPM

## 2019-05-24 DIAGNOSIS — M81.0 SENILE OSTEOPOROSIS: ICD-10-CM

## 2019-05-24 DIAGNOSIS — J30.89 PERENNIAL ALLERGIC RHINITIS: ICD-10-CM

## 2019-05-24 DIAGNOSIS — K22.10 EROSIVE ESOPHAGITIS: ICD-10-CM

## 2019-05-24 DIAGNOSIS — E78.00 HYPERCHOLESTEROLEMIA: ICD-10-CM

## 2019-05-24 DIAGNOSIS — Z79.899 ENCOUNTER FOR LONG-TERM CURRENT USE OF MEDICATION: ICD-10-CM

## 2019-05-24 DIAGNOSIS — Z12.39 SCREENING FOR MALIGNANT NEOPLASM OF BREAST: ICD-10-CM

## 2019-05-24 DIAGNOSIS — Z78.0 POSTMENOPAUSAL: ICD-10-CM

## 2019-05-24 DIAGNOSIS — Z86.010 HISTORY OF COLON POLYPS: ICD-10-CM

## 2019-05-24 DIAGNOSIS — F51.01 PRIMARY INSOMNIA: ICD-10-CM

## 2019-05-24 DIAGNOSIS — Z00.00 MEDICARE ANNUAL WELLNESS VISIT, SUBSEQUENT: Primary | ICD-10-CM

## 2019-05-24 DIAGNOSIS — Z92.29 HISTORY OF BISPHOSPHONATE THERAPY: ICD-10-CM

## 2019-05-24 DIAGNOSIS — F13.282 SEDATIVE, HYPNOTIC OR ANXIOLYTIC DEPENDENCE W SLEEP DISORDER (HCC): ICD-10-CM

## 2019-05-24 DIAGNOSIS — H90.3 SENSORY HEARING LOSS, BILATERAL: ICD-10-CM

## 2019-05-24 PROCEDURE — 85025 COMPLETE CBC W/AUTO DIFF WBC: CPT

## 2019-05-24 PROCEDURE — 80053 COMPREHEN METABOLIC PANEL: CPT

## 2019-05-24 PROCEDURE — 84439 ASSAY OF FREE THYROXINE: CPT

## 2019-05-24 PROCEDURE — 80061 LIPID PANEL: CPT

## 2019-05-24 PROCEDURE — 36415 COLL VENOUS BLD VENIPUNCTURE: CPT

## 2019-05-24 PROCEDURE — 96160 PT-FOCUSED HLTH RISK ASSMT: CPT | Performed by: FAMILY MEDICINE

## 2019-05-24 PROCEDURE — G0439 PPPS, SUBSEQ VISIT: HCPCS | Performed by: FAMILY MEDICINE

## 2019-05-24 PROCEDURE — 84443 ASSAY THYROID STIM HORMONE: CPT

## 2019-05-24 RX ORDER — ATORVASTATIN CALCIUM 10 MG/1
10 TABLET, FILM COATED ORAL NIGHTLY
Qty: 90 TABLET | Refills: 3 | Status: SHIPPED | OUTPATIENT
Start: 2019-05-24 | End: 2019-07-22

## 2019-05-24 RX ORDER — ZOLPIDEM TARTRATE 10 MG/1
10 TABLET ORAL NIGHTLY PRN
Qty: 90 TABLET | Refills: 3 | Status: SHIPPED | OUTPATIENT
Start: 2019-05-24 | End: 2019-12-15

## 2019-05-24 RX ORDER — LEVOCETIRIZINE DIHYDROCHLORIDE 5 MG/1
TABLET, FILM COATED ORAL
Qty: 90 TABLET | Refills: 3 | Status: SHIPPED | OUTPATIENT
Start: 2019-05-24 | End: 2020-10-02

## 2019-05-24 NOTE — TELEPHONE ENCOUNTER
LOV 09/07/2018, RTC in 6 months (March 2019). Needs f/u.  [facility] msg sent to patient

## 2019-05-24 NOTE — PROGRESS NOTES
HPI:   Adis Pradhan is a 75year old female who presents for a MA (Medicare Advantage) [facility] [facility] (Once per calendar year).          Fall/Risk Assessment   She has been screened for Falls and is low risk: Fall/Risk Scoring: (P) 1    Cognitive Assessme Component Value Date    CHOLEST 167 05/24/2019    HDL 58 05/24/2019    LDL 91 05/24/2019    TRIG 88 05/24/2019          Last Chemistry Labs:   Lab Results   Component Value Date    AST 27 05/24/2019    ALT 23 05/24/2019    CA 9.9 05/24/2019    ALB 4.0 05 Internal hemorrhoids (12/17/14), Leaking of urine (23 years ago), Perennial allergic rhinitis (4/29/2018), Postmenopausal (4/29/2018), Primary insomnia (8/6/2016), Problems with swallowing (5 years ago), Sedative, hypnotic or anxiolytic dependence w sleep as calculated from the following:    Height as of this encounter: 64\". Weight as of this encounter: 149 lb.     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 Repeat DEXA at appropriate interval.    - ASSAY, THYROID STIM HORMONE; Future  - FREE T4 (FREE THYROXINE); Future    8. Erosive esophagitis  Currently asymptomatic.    - CBC WITH DIFFERENTIAL WITH PLATELET; Future    9.  Perennial allergic rhinitis needed or indicated. Carlo Cullen DO, 5/24/2019     General Health     In the past six months, have you lost more than 10 pounds without trying?: (P) 2 - No  Has your appetite been poor?: (P) No  How would you describe your daily physical activity? Maintenance if applicable    Chlamydia  Annually if high risk No results found for: CHLAMYDIA No flowsheet data found.     Screening Mammogram      Mammogram Annually to 75, then as discussed Mammogram due on 07/02/2019 Update Health Maintenance if applicab

## 2019-05-24 NOTE — PATIENT INSTRUCTIONS
Recommended Websites for Advanced Directives    [employer]no. org/publications/Documents/personal_dec. pdf  An information packet, including necessary form from the [facility] website. http://www. [place].state. [place]/public/books/adv

## 2019-07-03 ENCOUNTER — HOSPITAL ENCOUNTER (OUTPATIENT)
Dept: MAMMOGRAPHY | Age: 74
Discharge: HOME | End: 2019-07-03
Attending: FAMILY MEDICINE
Payer: MEDICARE

## 2019-07-03 DIAGNOSIS — Z12.39 SCREENING FOR MALIGNANT NEOPLASM OF BREAST: ICD-10-CM

## 2019-07-03 PROCEDURE — 77063 BREAST TOMOSYNTHESIS BI: CPT | Performed by: FAMILY MEDICINE

## 2019-07-03 PROCEDURE — 77067 SCR MAMMO BI INCL CAD: CPT | Performed by: FAMILY MEDICINE

## 2019-07-21 DIAGNOSIS — E78.00 HYPERCHOLESTEROLEMIA: ICD-10-CM

## 2019-07-22 RX ORDER — ATORVASTATIN CALCIUM 10 MG/1
TABLET, FILM COATED ORAL
Qty: 90 TABLET | Refills: 0 | Status: SHIPPED | OUTPATIENT
Start: 2019-07-22 | End: 2019-10-18

## 2019-08-05 ENCOUNTER — APPOINTMENT (OUTPATIENT)
Dept: LAB | Facility: HOSPITAL | Age: 74
End: 2019-08-05
Attending: INTERNAL MEDICINE
Payer: MEDICARE

## 2019-08-05 ENCOUNTER — OFFICE VISIT (OUTPATIENT)
Dept: ENDOCRINOLOGY CLINIC | Facility: CLINIC | Age: 74
End: 2019-08-05
Payer: MEDICARE

## 2019-08-05 VITALS
DIASTOLIC BLOOD PRESSURE: 75 MMHG | BODY MASS INDEX: 26 KG/M2 | SYSTOLIC BLOOD PRESSURE: 132 MMHG | HEART RATE: 61 BPM | WEIGHT: 152 LBS

## 2019-08-05 DIAGNOSIS — E55.9 VITAMIN D DEFICIENCY: ICD-10-CM

## 2019-08-05 DIAGNOSIS — M81.8 OTHER OSTEOPOROSIS WITHOUT CURRENT PATHOLOGICAL FRACTURE: ICD-10-CM

## 2019-08-05 DIAGNOSIS — M81.8 OTHER OSTEOPOROSIS WITHOUT CURRENT PATHOLOGICAL FRACTURE: Primary | ICD-10-CM

## 2019-08-05 LAB — PTH-INTACT SERPL-MCNC: 62.5 PG/ML (ref 18.5–88)

## 2019-08-05 PROCEDURE — 36415 COLL VENOUS BLD VENIPUNCTURE: CPT

## 2019-08-05 PROCEDURE — 84080 ASSAY ALKALINE PHOSPHATASES: CPT

## 2019-08-05 PROCEDURE — 82306 VITAMIN D 25 HYDROXY: CPT

## 2019-08-05 PROCEDURE — 99213 OFFICE O/P EST LOW 20 MIN: CPT | Performed by: INTERNAL MEDICINE

## 2019-08-05 PROCEDURE — 83970 ASSAY OF PARATHORMONE: CPT

## 2019-08-05 NOTE — PROGRESS NOTES
Name: Trice Nugent  Date: 8/5/2019    Referring Physician: No ref. provider found    No chief complaint on file. HISTORY OF PRESENT ILLNESS   Trice Nugent is a 75year old female who presents for No chief complaint on file.     74 y/o F present ATORVASTATIN 10 MG Oral Tab, TAKE 1 TABLET(10 MG) BY MOUTH EVERY NIGHT, Disp: 90 tablet, Rfl: 0  •  Zolpidem Tartrate 10 MG Oral Tab, Take 1 tablet (10 mg total) by mouth nightly as needed for Sleep.  AT BEDTIME, Disp: 90 tablet, Rfl: 3  •  LEVOCETIRIZINE D • Hearing loss 2015   • High cholesterol 20 years ago   • History of bisphosphonate therapy 4/29/2018    4-5 years of Actonel.  Also, Prolia x3 injections   • History of colon polyps 4/29/2018   • Hypercholesterolemia 11/19/2015   • Internal hemorrhoids 1 to premature menopause  -Rest of secondary work up was negative  -She has now been started on forteo and tolerating well  -Normal calcium level  -Check bone specific alk phos, PTH, Vitamin D  -Will try to PA earlier DEXA  -Further management based on above

## 2019-08-06 LAB — BONE SPECIFIC ALKALINE PHOSPHATASE: 27.1 UG/L

## 2019-08-07 ENCOUNTER — TELEPHONE (OUTPATIENT)
Dept: ENDOCRINOLOGY CLINIC | Facility: CLINIC | Age: 74
End: 2019-08-07

## 2019-08-07 LAB — 25(OH)D3 SERPL-MCNC: 24.6 NG/ML (ref 30–100)

## 2019-08-08 NOTE — TELEPHONE ENCOUNTER
Contacted patient's insurance ([address] [place] [address],[address]) and spoke with José Coker (ref #: [id number]). No authorization required for early DEXA scan (CPT 90227).   Would you like me to send Fix8t message to patient notifying her no auth required and she can schedule constantino

## 2019-08-13 ENCOUNTER — HOSPITAL ENCOUNTER (OUTPATIENT)
Dept: BONE DENSITY | Age: 74
Discharge: HOME | End: 2019-08-13
Attending: INTERNAL MEDICINE
Payer: MEDICARE

## 2019-08-13 DIAGNOSIS — M81.8 OTHER OSTEOPOROSIS WITHOUT CURRENT PATHOLOGICAL FRACTURE: ICD-10-CM

## 2019-08-13 PROCEDURE — 77080 DXA BONE DENSITY AXIAL: CPT | Performed by: INTERNAL MEDICINE

## 2019-08-19 DIAGNOSIS — Z79.899 ENCOUNTER FOR LONG-TERM CURRENT USE OF MEDICATION: ICD-10-CM

## 2019-08-19 DIAGNOSIS — F13.282 SEDATIVE, HYPNOTIC OR ANXIOLYTIC DEPENDENCE W SLEEP DISORDER (HCC): ICD-10-CM

## 2019-08-19 DIAGNOSIS — F51.01 PRIMARY INSOMNIA: ICD-10-CM

## 2019-08-19 RX ORDER — ZOLPIDEM TARTRATE 10 MG/1
10 TABLET ORAL NIGHTLY PRN
Qty: 90 TABLET | Refills: 3
Start: 2019-08-19

## 2019-08-19 NOTE — TELEPHONE ENCOUNTER
Zolpidem Tartrate 10 MG Oral Tab 90 tablet 3 5/24/2019     Sig - Route: Take 1 tablet (10 mg total) by mouth nightly as needed for Sleep.  AT BEDTIME - Oral        Rx denied  90 tablets with 3 refills sent 5/24/19

## 2019-10-18 DIAGNOSIS — E78.00 HYPERCHOLESTEROLEMIA: ICD-10-CM

## 2019-10-20 ENCOUNTER — PATIENT MESSAGE (OUTPATIENT)
Dept: ENDOCRINOLOGY CLINIC | Facility: CLINIC | Age: 74
End: 2019-10-20

## 2019-10-20 DIAGNOSIS — F51.01 PRIMARY INSOMNIA: ICD-10-CM

## 2019-10-20 DIAGNOSIS — Z79.899 ENCOUNTER FOR LONG-TERM CURRENT USE OF MEDICATION: ICD-10-CM

## 2019-10-20 DIAGNOSIS — F13.282 SEDATIVE, HYPNOTIC OR ANXIOLYTIC DEPENDENCE W SLEEP DISORDER (HCC): ICD-10-CM

## 2019-10-21 RX ORDER — PEN NEEDLE, DIABETIC 31 GX3/16"
NEEDLE, DISPOSABLE MISCELLANEOUS
Qty: 100 EACH | Refills: 1 | Status: SHIPPED | OUTPATIENT
Start: 2019-10-21 | End: 2020-05-29

## 2019-10-21 RX ORDER — ATORVASTATIN CALCIUM 10 MG/1
TABLET, FILM COATED ORAL
Qty: 90 TABLET | Refills: 0 | Status: SHIPPED | OUTPATIENT
Start: 2019-10-21 | End: 2020-05-29

## 2019-10-21 RX ORDER — ZOLPIDEM TARTRATE 10 MG/1
TABLET ORAL
Qty: 90 TABLET | Refills: 0 | OUTPATIENT
Start: 2019-10-21

## 2019-10-21 NOTE — TELEPHONE ENCOUNTER
Pt requesting refill of ZOLPIDEM TARTRATE 10 MG Oral Tab    Last Time Medication was Filled: 5/24/19      Zolpidem Tartrate 10 MG Oral Tab 90 tablet 3 5/24/2019     Sig - Route: Take 1 tablet (10 mg total) by mouth nightly as needed for Sleep.  AT BEDTIME

## 2019-10-21 NOTE — TELEPHONE ENCOUNTER
From: Clarence Olson  To: Samantha Baker MD  Sent: 10/20/2019 4:41 PM CDT  Subject: Prescription Question    I need needles for the FORTEO prescription. Please advise since I am unable to request them through My Chart.   Elena Poon

## 2019-11-11 ENCOUNTER — TELEPHONE (OUTPATIENT)
Dept: FAMILY MEDICINE CLINIC | Facility: CLINIC | Age: 74
End: 2019-11-11

## 2019-11-11 NOTE — TELEPHONE ENCOUNTER
PA initiated through cover my meds for Zolpidem  Dx: F51.01, F13.282 and Z79.899. Your information has been submitted to [employer]. [employer] is reviewing the PA request and you will receive an electronic response.  You may check for the updated

## 2019-12-15 DIAGNOSIS — Z79.899 ENCOUNTER FOR LONG-TERM CURRENT USE OF MEDICATION: ICD-10-CM

## 2019-12-15 DIAGNOSIS — F51.01 PRIMARY INSOMNIA: ICD-10-CM

## 2019-12-15 DIAGNOSIS — F13.282 SEDATIVE, HYPNOTIC OR ANXIOLYTIC DEPENDENCE W SLEEP DISORDER (HCC): ICD-10-CM

## 2019-12-16 RX ORDER — ZOLPIDEM TARTRATE 10 MG/1
TABLET ORAL
Qty: 90 TABLET | Refills: 0 | Status: SHIPPED | OUTPATIENT
Start: 2019-12-16 | End: 2020-03-09

## 2019-12-16 NOTE — TELEPHONE ENCOUNTER
Pt requesting refill of ZOLPIDEM TARTRATE 10 MG Oral Tab    Last Time Medication was Filled:  2019 qty 1 year    Last Office Visit with PCP: 19     No future appointments. Script has .  Needs to be resent

## 2020-01-03 ENCOUNTER — TELEPHONE (OUTPATIENT)
Dept: FAMILY MEDICINE CLINIC | Facility: CLINIC | Age: 75
End: 2020-01-03

## 2020-03-07 DIAGNOSIS — F51.01 PRIMARY INSOMNIA: ICD-10-CM

## 2020-03-07 DIAGNOSIS — Z79.899 ENCOUNTER FOR LONG-TERM CURRENT USE OF MEDICATION: ICD-10-CM

## 2020-03-07 DIAGNOSIS — F13.282 SEDATIVE, HYPNOTIC OR ANXIOLYTIC DEPENDENCE W SLEEP DISORDER (HCC): ICD-10-CM

## 2020-03-09 RX ORDER — ZOLPIDEM TARTRATE 10 MG/1
TABLET ORAL
Qty: 90 TABLET | Refills: 0 | Status: SHIPPED | OUTPATIENT
Start: 2020-03-09 | End: 2020-05-29

## 2020-03-09 NOTE — TELEPHONE ENCOUNTER
Pt requesting refill of ZOLPIDEM TARTRATE 10 MG Oral Tab    Last Time Medication was Filled:  12/16/19    Last Office Visit with Provider: 5/14/19    Appt scheduled on 3/26/20

## 2020-03-12 ENCOUNTER — TELEPHONE (OUTPATIENT)
Dept: FAMILY MEDICINE CLINIC | Facility: CLINIC | Age: 75
End: 2020-03-12

## 2020-03-12 NOTE — TELEPHONE ENCOUNTER
Left detailed message for patient advising she keep her appointment for further medication refills. Advised her LOV was in May 2019 and an appointment was needed for further refills.    Advised we can provide patient with mask at the time of the visit if sh

## 2020-03-12 NOTE — TELEPHONE ENCOUNTER
Pt called to cancel her Supervisit appt because of the Coronavirus but she needs a med refill. I told her it's been since last May since she has been here and she said [facility] will just fill her meds.

## 2020-05-29 PROBLEM — G47.00 INSOMNIA: Status: ACTIVE | Noted: 2020-05-29

## 2020-05-29 NOTE — PROGRESS NOTES
Video visit with live 2 way video and audio via [address] verbally consents to a Video visit with live 2 way video and audio via [employer] on 05/29/20.   Patient has been referred to the [facility] website at [email or web address] to review D (CALCIUM CITRATE+D3 OR) Take 4 tablets by mouth daily.         Patient Active Problem List:     Senile osteoporosis     Hypercholesterolemia     Sensory hearing loss, bilateral     Encounter for long-term current use of medication     History of colon tomer 1 Standard drinks or equivalent per week    Drug use: No       REVIEW OF SYSTEMS:   GENERAL: Overall feels well.   SKIN: Rash none  EYES: Denies changes in vision  HEENT: Denies sore throat or other upper respiratory symptoms  LUNGS: Denies cough or shortne needed for Sleep. at bedtime  Dispense: 90 tablet; Refill: 3    3. Hypercholesterolemia  Well-controlled on atorvastatin. Continue healthy diet and regular exercise when able. - atorvastatin 10 MG Oral Tab; Take 1 tablet (10 mg total) by mouth nightly.

## 2020-06-03 DIAGNOSIS — F13.282 SEDATIVE, HYPNOTIC OR ANXIOLYTIC DEPENDENCE W SLEEP DISORDER (HCC): ICD-10-CM

## 2020-06-03 DIAGNOSIS — Z79.899 ENCOUNTER FOR LONG-TERM CURRENT USE OF MEDICATION: ICD-10-CM

## 2020-06-03 RX ORDER — ZOLPIDEM TARTRATE 10 MG/1
10 TABLET ORAL NIGHTLY PRN
Qty: 90 TABLET | Refills: 3 | Status: SHIPPED | OUTPATIENT
Start: 2020-06-03 | End: 2021-03-01

## 2020-06-03 NOTE — TELEPHONE ENCOUNTER
Pt's local pharmacy called and said Zolpidem 10mg is on backorder and she can not transfer the script because it's a controlled substance. She said [facility] on [address] [address] has it in stock so she needs a new scripts sent there. [phone].

## 2020-08-05 ENCOUNTER — TELEPHONE (OUTPATIENT)
Dept: CASE MANAGEMENT | Age: 75
End: 2020-08-05

## 2020-08-27 ENCOUNTER — OFFICE VISIT (OUTPATIENT)
Dept: FAMILY MEDICINE CLINIC | Facility: CLINIC | Age: 75
End: 2020-08-27
Payer: MEDICARE

## 2020-08-27 VITALS
BODY MASS INDEX: 25.78 KG/M2 | SYSTOLIC BLOOD PRESSURE: 118 MMHG | HEIGHT: 63.8 IN | DIASTOLIC BLOOD PRESSURE: 68 MMHG | HEART RATE: 58 BPM | WEIGHT: 149.19 LBS | OXYGEN SATURATION: 99 % | TEMPERATURE: 98 F

## 2020-08-27 DIAGNOSIS — M81.0 SENILE OSTEOPOROSIS: ICD-10-CM

## 2020-08-27 DIAGNOSIS — G47.00 INSOMNIA, UNSPECIFIED TYPE: ICD-10-CM

## 2020-08-27 DIAGNOSIS — J30.89 PERENNIAL ALLERGIC RHINITIS: ICD-10-CM

## 2020-08-27 DIAGNOSIS — F13.282 SEDATIVE, HYPNOTIC OR ANXIOLYTIC DEPENDENCE W SLEEP DISORDER (HCC): ICD-10-CM

## 2020-08-27 DIAGNOSIS — Z00.00 MEDICARE ANNUAL WELLNESS VISIT, SUBSEQUENT: Primary | ICD-10-CM

## 2020-08-27 DIAGNOSIS — H90.3 SENSORY HEARING LOSS, BILATERAL: ICD-10-CM

## 2020-08-27 DIAGNOSIS — Z12.31 ENCOUNTER FOR SCREENING MAMMOGRAM FOR MALIGNANT NEOPLASM OF BREAST: ICD-10-CM

## 2020-08-27 DIAGNOSIS — Z92.29 HISTORY OF BISPHOSPHONATE THERAPY: ICD-10-CM

## 2020-08-27 DIAGNOSIS — Z78.0 POSTMENOPAUSAL: ICD-10-CM

## 2020-08-27 DIAGNOSIS — E78.00 HYPERCHOLESTEROLEMIA: ICD-10-CM

## 2020-08-27 PROBLEM — Z79.899 ENCOUNTER FOR LONG-TERM CURRENT USE OF MEDICATION: Status: RESOLVED | Noted: 2017-03-17 | Resolved: 2020-08-27

## 2020-08-27 PROBLEM — K22.10 EROSIVE ESOPHAGITIS: Status: RESOLVED | Noted: 2018-04-29 | Resolved: 2020-08-27

## 2020-08-27 PROBLEM — Z86.010 HISTORY OF COLON POLYPS: Status: RESOLVED | Noted: 2018-04-29 | Resolved: 2020-08-27

## 2020-08-27 PROCEDURE — 3078F DIAST BP <80 MM HG: CPT | Performed by: FAMILY MEDICINE

## 2020-08-27 PROCEDURE — 99397 PER PM REEVAL EST PAT 65+ YR: CPT | Performed by: FAMILY MEDICINE

## 2020-08-27 PROCEDURE — 3074F SYST BP LT 130 MM HG: CPT | Performed by: FAMILY MEDICINE

## 2020-08-27 PROCEDURE — G0439 PPPS, SUBSEQ VISIT: HCPCS | Performed by: FAMILY MEDICINE

## 2020-08-27 PROCEDURE — 96160 PT-FOCUSED HLTH RISK ASSMT: CPT | Performed by: FAMILY MEDICINE

## 2020-08-27 PROCEDURE — 3008F BODY MASS INDEX DOCD: CPT | Performed by: FAMILY MEDICINE

## 2020-08-27 NOTE — PATIENT INSTRUCTIONS
Tracey Negron's SCREENING SCHEDULE   Tests on this list are recommended by your physician but may not be covered, or covered at this frequency, by your insurer. Please check with your insurance carrier before scheduling to verify coverage.    REAGAN and have smoked more than 100 cigarettes in their lifetime   • Anyone with a family history    Colorectal Cancer Screening  Covered up to Age 76     Colonoscopy Screen   Covered every 10 years- more often if abnormal Colonoscopy due on 12/17/2024 Update He this or any previous visit.  Please get every year    Pneumococcal 13 (Prevnar)  Covered Once after 65 Orders placed or performed in visit on 11/19/15   • PNEUMOCOCCAL VACC, 13 JAMES IM    Please get once after your 65th birthday    Pneumococcal 23 (Pneumovax

## 2020-08-27 NOTE — PROGRESS NOTES
HPI:   Clarence Olson is a 67year old female who presents for a MA (Medicare Advantage) [facility] (Once per calendar year). Stopped Forteo \"on my own\". States she took Forteo for over 13 months.     Pain left hip, goes away, still walks 3 mile Last Chemistry Labs:   Lab Results   Component Value Date    AST 27 05/24/2019    ALT 23 05/24/2019    CA 9.9 05/24/2019    ALB 4.0 05/24/2019    TSH 0.905 05/24/2019    CREATSERUM 0.92 05/24/2019    GLU 88 05/24/2019        CBC  (most recent labs)   L past surgical history that includes Total abdominal hysterectomy; upper gi endoscopy performed (12/17/2014); colonoscopy & polypectomy (12/17/14); upper gi endoscopy,exam; colonoscopy (2014); and total abdom hysterectomy (1995).     Her family history inclu 3.2 oz (67.7 kg).     Medicare Hearing Assessment  (Required for AWV/SWV)    Hearing Screening    Time taken:  8/27/2020 10:30 AM  Screening Method:  Finger Rub  Finger Rub Result:  Pass                       General Appearance:  Alert, cooperative, no dist old female who presents for a Medicare Assessment.      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 Sensory hearing loss, bilateral  Patient declines referral to audiologist/ENT.    10. Encounter for screening mammogram for malignant neoplasm of breast  - BRENDAN ALMA 2D+3D SCREENING BILAT (CPT=77067/29082);  Future        Orders Placed necessary Electrocardiogram date       Colorectal Cancer Screening      Colonoscopy Screen every 10 years Colonoscopy due on 12/17/2024 Update Health Maintenance if applicable    Flex Sigmoidoscopy Screen every 10 years No results found for this or any pre This may be covered with your prescription benefits, but Medicare does not cover unless Medically needed    Zoster  Not covered by Medicare Part B No vaccine history found This may be covered with your pharmacy  prescription benefits                    Tem

## 2020-09-03 ENCOUNTER — HOSPITAL ENCOUNTER (OUTPATIENT)
Dept: BONE DENSITY | Age: 75
Discharge: HOME | End: 2020-09-03
Attending: FAMILY MEDICINE
Payer: MEDICARE

## 2020-09-03 DIAGNOSIS — M81.0 SENILE OSTEOPOROSIS: ICD-10-CM

## 2020-09-03 DIAGNOSIS — Z78.0 POSTMENOPAUSAL: ICD-10-CM

## 2020-09-03 PROCEDURE — 77080 DXA BONE DENSITY AXIAL: CPT | Performed by: FAMILY MEDICINE

## 2020-09-10 ENCOUNTER — LAB ENCOUNTER (OUTPATIENT)
Dept: LAB | Age: 75
End: 2020-09-10
Attending: FAMILY MEDICINE
Payer: MEDICARE

## 2020-09-10 DIAGNOSIS — E78.00 HYPERCHOLESTEROLEMIA: ICD-10-CM

## 2020-09-10 DIAGNOSIS — J30.89 PERENNIAL ALLERGIC RHINITIS: ICD-10-CM

## 2020-09-10 LAB
ALBUMIN SERPL-MCNC: 3.9 G/DL (ref 3.4–5)
ALBUMIN/GLOB SERPL: 1.1 {RATIO} (ref 1–2)
ALP LIVER SERPL-CCNC: 101 U/L (ref 55–142)
ALT SERPL-CCNC: 24 U/L (ref 13–56)
ANION GAP SERPL CALC-SCNC: 3 MMOL/L (ref 0–18)
AST SERPL-CCNC: 24 U/L (ref 15–37)
BASOPHILS # BLD AUTO: 0.02 X10(3) UL (ref 0–0.2)
BASOPHILS NFR BLD AUTO: 0.4 %
BILIRUB SERPL-MCNC: 0.6 MG/DL (ref 0.1–2)
BUN BLD-MCNC: 14 MG/DL (ref 7–18)
BUN/CREAT SERPL: 16.5 (ref 10–20)
CALCIUM BLD-MCNC: 9.7 MG/DL (ref 8.5–10.1)
CHLORIDE SERPL-SCNC: 107 MMOL/L (ref 98–112)
CHOLEST SMN-MCNC: 170 MG/DL (ref ?–200)
CO2 SERPL-SCNC: 30 MMOL/L (ref 21–32)
CREAT BLD-MCNC: 0.85 MG/DL (ref 0.55–1.02)
DEPRECATED RDW RBC AUTO: 43.7 FL (ref 35.1–46.3)
EOSINOPHIL # BLD AUTO: 0.07 X10(3) UL (ref 0–0.7)
EOSINOPHIL NFR BLD AUTO: 1.5 %
ERYTHROCYTE [DISTWIDTH] IN BLOOD BY AUTOMATED COUNT: 12.6 % (ref 11–15)
GLOBULIN PLAS-MCNC: 3.7 G/DL (ref 2.8–4.4)
GLUCOSE BLD-MCNC: 94 MG/DL (ref 70–99)
HCT VFR BLD AUTO: 44.6 % (ref 35–48)
HDLC SERPL-MCNC: 59 MG/DL (ref 40–59)
HGB BLD-MCNC: 14.2 G/DL (ref 12–16)
IMM GRANULOCYTES # BLD AUTO: 0.01 X10(3) UL (ref 0–1)
IMM GRANULOCYTES NFR BLD: 0.2 %
LDLC SERPL CALC-MCNC: 94 MG/DL (ref ?–100)
LYMPHOCYTES # BLD AUTO: 1.68 X10(3) UL (ref 1–4)
LYMPHOCYTES NFR BLD AUTO: 34.9 %
M PROTEIN MFR SERPL ELPH: 7.6 G/DL (ref 6.4–8.2)
MCH RBC QN AUTO: 30.3 PG (ref 26–34)
MCHC RBC AUTO-ENTMCNC: 31.8 G/DL (ref 31–37)
MCV RBC AUTO: 95.3 FL (ref 80–100)
MONOCYTES # BLD AUTO: 0.55 X10(3) UL (ref 0.1–1)
MONOCYTES NFR BLD AUTO: 11.4 %
NEUTROPHILS # BLD AUTO: 2.48 X10 (3) UL (ref 1.5–7.7)
NEUTROPHILS # BLD AUTO: 2.48 X10(3) UL (ref 1.5–7.7)
NEUTROPHILS NFR BLD AUTO: 51.6 %
NONHDLC SERPL-MCNC: 111 MG/DL (ref ?–130)
OSMOLALITY SERPL CALC.SUM OF ELEC: 290 MOSM/KG (ref 275–295)
PATIENT FASTING Y/N/NP: YES
PATIENT FASTING Y/N/NP: YES
PLATELET # BLD AUTO: 200 10(3)UL (ref 150–450)
POTASSIUM SERPL-SCNC: 4.3 MMOL/L (ref 3.5–5.1)
RBC # BLD AUTO: 4.68 X10(6)UL (ref 3.8–5.3)
SODIUM SERPL-SCNC: 140 MMOL/L (ref 136–145)
T4 FREE SERPL-MCNC: 1 NG/DL (ref 0.8–1.7)
TRIGL SERPL-MCNC: 85 MG/DL (ref 30–149)
TSI SER-ACNC: 1.65 MIU/ML (ref 0.36–3.74)
VLDLC SERPL CALC-MCNC: 17 MG/DL (ref 0–30)
WBC # BLD AUTO: 4.8 X10(3) UL (ref 4–11)

## 2020-09-10 PROCEDURE — 36415 COLL VENOUS BLD VENIPUNCTURE: CPT

## 2020-09-10 PROCEDURE — 84439 ASSAY OF FREE THYROXINE: CPT

## 2020-09-10 PROCEDURE — 85025 COMPLETE CBC W/AUTO DIFF WBC: CPT

## 2020-09-10 PROCEDURE — 80061 LIPID PANEL: CPT

## 2020-09-10 PROCEDURE — 80053 COMPREHEN METABOLIC PANEL: CPT

## 2020-09-10 PROCEDURE — 84443 ASSAY THYROID STIM HORMONE: CPT

## 2020-09-14 ENCOUNTER — PATIENT MESSAGE (OUTPATIENT)
Dept: FAMILY MEDICINE CLINIC | Facility: CLINIC | Age: 75
End: 2020-09-14

## 2020-09-14 ENCOUNTER — PATIENT MESSAGE (OUTPATIENT)
Dept: ENDOCRINOLOGY CLINIC | Facility: CLINIC | Age: 75
End: 2020-09-14

## 2020-09-14 NOTE — TELEPHONE ENCOUNTER
From: Nilsa Apo  To: Pao Fernandez DO  Sent: 9/14/2020 12:14 PM CDT  Subject: Test Results Question    Questions for virtual conf.   In January I discontinued the Forteo shots after 13 months, but am still taking two 600 mg of calcium in the morni

## 2020-09-15 NOTE — TELEPHONE ENCOUNTER
Pt free from falls/injury. AAOx4, NAD, no complaints of pain. Remains on 3L O2. Pt repositions with no assistance, ambulates with standby assistance. Call light in reach. Will continue to monitor.     Problem: Fall Injury Risk  Goal: Absence of Fall and Fall-Related Injury  Outcome: Ongoing, Progressing     Problem: Adjustment to Illness COPD (Chronic Obstructive Pulmonary Disease)  Goal: Optimal Chronic Illness Coping  Outcome: Ongoing, Progressing     Problem: Functional Ability Impaired COPD (Chronic Obstructive Pulmonary Disease)  Goal: Optimal Level of Functional [place]  Outcome: Ongoing, Progressing     Problem: Oral Intake Inadequate COPD (Chronic Obstructive Pulmonary Disease)  Goal: Improved Nutrition Intake  Outcome: Ongoing, Progressing     Problem: Infection COPD (Chronic Obstructive Pulmonary Disease)  Goal: Absence of Infection Signs/Symptoms  Outcome: Ongoing, Progressing     Problem: Respiratory Compromise COPD (Chronic Obstructive Pulmonary Disease)  Goal: Effective Oxygenation and Ventilation  Outcome: Ongoing, Progressing     Problem: Adult Inpatient Plan of Care  Goal: Plan of Care Review  Outcome: Ongoing, Progressing  Goal: Patient-Specific Goal (Individualization)  Outcome: Ongoing, Progressing  Goal: Absence of Hospital-Acquired Illness or Injury  Outcome: Ongoing, Progressing  Goal: Optimal Comfort and Wellbeing  Outcome: Ongoing, Progressing  Goal: Readiness for Transition of Care  Outcome: Ongoing, Progressing  Goal: Rounds/Family Conference  Outcome: Ongoing, Progressing     Problem: Skin Injury Risk Increased  Goal: Skin Health and Integrity  Outcome: Ongoing, Progressing     Problem: Coping Ineffective  Goal: Effective Coping  Outcome: Ongoing, Progressing      Unfortunately this medication is one of the few that will build new bone. We can sometimes get Medicare to pay for a DEXA scan after one year since you are on this particular medication to evaluate if the medication is working.   In addition I do monitor t

## 2020-09-16 NOTE — TELEPHONE ENCOUNTER
Dr. Gaby Edmond -- would you like for patient to come in to discuss below message/questions? Last office visit with you 08/05/19 RTC 6 months no appointment scheduled. If so, would your first available in November be okay or you want to double book?  Last Dexa 08

## 2020-09-16 NOTE — TELEPHONE ENCOUNTER
From: Christen Mcintosh  To: Spring Govea MD  Sent: 9/14/2020 12:12 PM CDT  Subject: Test Results Question    In January I discontinued the Forteo shots after 13 months, but am still taking two 600 mg of calcium in the morning and two 600 mg of calcium at n

## 2020-09-19 NOTE — TELEPHONE ENCOUNTER
[employer] message sent. Office staff=please check if patient read [employer] message, please assists with the follow up office visit=see Dr Jeny Bauer note below dated 9/16/20. No future appointments.

## 2020-09-22 NOTE — TELEPHONE ENCOUNTER
Patient was contacted and booked doximity video visit (explained the process and she voiced understanding)    Future Appointments   Date Time Provider Taylor Watson   9/23/2020  8:15 AM MD CONCHA Salvador

## 2020-09-23 ENCOUNTER — TELEMEDICINE (OUTPATIENT)
Dept: ENDOCRINOLOGY CLINIC | Facility: CLINIC | Age: 75
End: 2020-09-23
Payer: MEDICARE

## 2020-09-23 ENCOUNTER — TELEPHONE (OUTPATIENT)
Dept: ENDOCRINOLOGY CLINIC | Facility: CLINIC | Age: 75
End: 2020-09-23

## 2020-09-23 DIAGNOSIS — M81.8 OTHER OSTEOPOROSIS WITHOUT CURRENT PATHOLOGICAL FRACTURE: Primary | ICD-10-CM

## 2020-09-23 PROCEDURE — 99214 OFFICE O/P EST MOD 30 MIN: CPT | Performed by: INTERNAL MEDICINE

## 2020-09-23 NOTE — PROGRESS NOTES
Telehealth outside of [address] Verbal Consent   I conducted a telehealth visit with Christen Mcintosh today, 09/23/20, which was completed using two-way, real-time interactive audio and video communication.  This has been done in good marva to pro initially treated with Actonel however ineffective therefore transitioned to prolia therapy in 2016. She stopped medication after 18 months in 2017 due to inefficacy of the medication. No falls or fractures. No h/o nephrolithiasis.      No significant c mouth nightly., Disp: 90 tablet, Rfl: 3  •  LEVOCETIRIZINE DIHYDROCHLORIDE 5 MG Oral Tab, TAKE 1 TABLET(5 MG) BY MOUTH EVERY EVENING, Disp: 90 tablet, Rfl: 3  •  Calcium Citrate-Vitamin D (CALCIUM CITRATE+D3 OR), Take 4 tablets by mouth daily. 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     RTC 6 months     8/5/2019  Janina Garcia MD

## 2020-09-28 ENCOUNTER — PATIENT MESSAGE (OUTPATIENT)
Dept: ENDOCRINOLOGY CLINIC | Facility: CLINIC | Age: 75
End: 2020-09-28

## 2020-09-30 NOTE — TELEPHONE ENCOUNTER
The dexa bone scan is released to [employer]. Patient notified. Please advise if the patient may receive the injection at [facility] per LOV note? I see the SOB is still pending.

## 2020-09-30 NOTE — TELEPHONE ENCOUNTER
From: Dmitri Becerril  To: Norman Elkins MD  Sent: 9/28/2020 6:25 PM CDT  Subject: Other    In our virtual conference you said that you would check on whether Eventy could be arranged through Dr. Zeny Dey. Also I asked for a copy of the dexa scan.   Thank yo

## 2020-09-30 NOTE — TELEPHONE ENCOUNTER
I contacted the supervisor regarding arrangement at [facility] so we are working on this option. But I do not know yet if it is possible. The patient will need to contact medical records to obtain a CD of images.   Or we could just print a paper copy if that i

## 2020-10-01 NOTE — TELEPHONE ENCOUNTER
Received SOB. No PA needed. Pt is to owe 50% OOP for evenity. Called to inform. Pt states that is very expensive. She asked for a update if she can receive Evenity with PCP.  Advised that it is still being worked on and that someone for the clinic will info

## 2020-10-02 DIAGNOSIS — E78.00 HYPERCHOLESTEROLEMIA: ICD-10-CM

## 2020-10-02 DIAGNOSIS — Z79.899 ENCOUNTER FOR LONG-TERM CURRENT USE OF MEDICATION: ICD-10-CM

## 2020-10-02 RX ORDER — ATORVASTATIN CALCIUM 10 MG/1
10 TABLET, FILM COATED ORAL NIGHTLY
Qty: 90 TABLET | Refills: 3 | Status: SHIPPED | OUTPATIENT
Start: 2020-10-02 | End: 2021-06-17

## 2020-10-02 RX ORDER — LEVOCETIRIZINE DIHYDROCHLORIDE 5 MG/1
TABLET, FILM COATED ORAL
Qty: 90 TABLET | Refills: 3 | Status: SHIPPED | OUTPATIENT
Start: 2020-10-02 | End: 2021-06-17

## 2020-10-02 NOTE — TELEPHONE ENCOUNTER
Requested Prescriptions     Pending Prescriptions Disp Refills   • LEVOCETIRIZINE DIHYDROCHLORIDE 5 MG Oral Tab [Pharmacy Med Name: LEVOCETIRIZINE 5MG TABLETS] 90 tablet 3     Sig: TAKE 1 TABLET(5 MG) BY MOUTH EVERY EVENING     Passed protocol.  Refill sent

## 2020-10-02 NOTE — TELEPHONE ENCOUNTER
Requested Prescriptions     Signed Prescriptions Disp Refills   • atorvastatin 10 MG Oral Tab 90 tablet 3     Sig: Take 1 tablet (10 mg total) by mouth nightly.      Authorizing Provider: Brian Warner     Ordering User: Ana Lilia Carlson     Passed protocol

## 2020-10-14 NOTE — TELEPHONE ENCOUNTER
Given the high cost of Evenity would not recommend this therapy. The cost would still be very high at THE [facility] location since insurance is only covering 50% of med.       We could consider finishing her forteo therapy since she did not complete full 2 years

## 2020-10-14 NOTE — TELEPHONE ENCOUNTER
Dr. Jorje Pat,     Received a call from Dr. Cyole [facility] office regarding the request for Evenity to be facilitated through them. They were not able to find the drug in [facility] to order and they have also not administered this drug before.   They are also worried

## 2020-10-14 NOTE — TELEPHONE ENCOUNTER
Patient was contacted and she would like to proceed with Evenity because as you discussed with her it will build up her bones and retain it.   She states when she was on forteo she was paying 25% of the drug (cost was $4000), so it's not much of a differenc

## 2020-10-14 NOTE — TELEPHONE ENCOUNTER
Spoke with RN supervisor to follow up on this request and was informed there's no process in place for this type of request.  [facility] was also brought up but there's no physician to oversee it there in case of adverse reaction.   In the meantime, ross

## 2020-10-14 NOTE — TELEPHONE ENCOUNTER
Osiel Gilbert called back and updated her with below information. Also asked her if there's anything in their end that can be done. Made her aware that per patient she's not looking into doing anything until January of 2021 due to \"catastrophic donut hole\".   RN

## 2020-10-26 ENCOUNTER — PATIENT MESSAGE (OUTPATIENT)
Dept: FAMILY MEDICINE CLINIC | Facility: CLINIC | Age: 75
End: 2020-10-26

## 2020-10-26 DIAGNOSIS — M81.0 SENILE OSTEOPOROSIS: Primary | ICD-10-CM

## 2020-10-26 NOTE — TELEPHONE ENCOUNTER
From: Adis Pradhan  Sent: 10/26/2020 11:59 AM CDT  To: [facility] Clinical Staff  Subject: RE: Prescription Question    Lenard Best,  I called BC/BS and they gave me the name [facility] [address] [facility].  Is there a reason why that

## 2020-11-30 ENCOUNTER — TELEPHONE (OUTPATIENT)
Dept: FAMILY MEDICINE CLINIC | Facility: CLINIC | Age: 75
End: 2020-11-30

## 2020-11-30 NOTE — TELEPHONE ENCOUNTER
Received fax from [facility], plan does not over this medication zolpidem,  Please call plan at [phone]  Patient ID [id number]  Or Cover My Meds Key [id number]

## 2020-11-30 NOTE — TELEPHONE ENCOUNTER
Anai Logan (Key: [id number])     Your information has been submitted to [employer]. [employer] is reviewing the PA request and you will receive an electronic response.  You may check for the updated outcome later by reopening this request. The standard

## 2020-12-02 ENCOUNTER — TELEPHONE (OUTPATIENT)
Dept: FAMILY MEDICINE CLINIC | Facility: CLINIC | Age: 75
End: 2020-12-02

## 2020-12-02 NOTE — TELEPHONE ENCOUNTER
Pt called and said she needs an authorization to get more Zolpidem 10mg. She said her bottle reads 2 more refills before 12/7/20 but the pharmacy will not fill it for her. She sounded like she was a little confused.

## 2020-12-02 NOTE — TELEPHONE ENCOUNTER
ZOLPIDEM TARTRATE 08/30/2020 06/03/2020  90 tablet  90     Per pharmacist,  PA is required as it is a high risk medication    Cover My Meds:  [id number]    Approved on November 30  Drug:Zolpidem Tartrate 10MG tablets    Pharmacy informed

## 2020-12-03 ENCOUNTER — PATIENT MESSAGE (OUTPATIENT)
Dept: FAMILY MEDICINE CLINIC | Facility: CLINIC | Age: 75
End: 2020-12-03

## 2020-12-03 NOTE — TELEPHONE ENCOUNTER
From: Clarence Clear  To: Raford Cogan, DO  Sent: 12/3/2020 4:46 PM CST  Subject: Prescription Question    I called on Tuesday about my Zolpidem prescription and was told that the nurse would call me back. That has not happened.   My number is [phone]
ZOLPIDEM TARTRATE 08/30/2020 06/03/2020   90 tablet   90   12/2/20:  Per pharmacist,  PA is required as it is a high risk medication     Cover My Meds:  [id number]    Approved on November 30  Drug:Zolpidem Tartrate 10MG tablets     Pharmacy informed       Re
Yes

## 2021-01-24 ENCOUNTER — PATIENT MESSAGE (OUTPATIENT)
Dept: FAMILY MEDICINE CLINIC | Facility: CLINIC | Age: 76
End: 2021-01-24

## 2021-01-25 NOTE — TELEPHONE ENCOUNTER
From: Yamilet Rodriguez  To: Last Poon DO  Sent: 1/24/2021 6:55 AM CST  Subject: Other    Can I schedule a Covid vaccination with you?   Thanks,  Brianna Zamorano

## 2021-02-28 DIAGNOSIS — Z79.899 ENCOUNTER FOR LONG-TERM CURRENT USE OF MEDICATION: ICD-10-CM

## 2021-02-28 DIAGNOSIS — F13.282 SEDATIVE, HYPNOTIC OR ANXIOLYTIC DEPENDENCE W SLEEP DISORDER (HCC): ICD-10-CM

## 2021-03-01 DIAGNOSIS — Z79.899 ENCOUNTER FOR LONG-TERM CURRENT USE OF MEDICATION: ICD-10-CM

## 2021-03-01 DIAGNOSIS — F13.282 SEDATIVE, HYPNOTIC OR ANXIOLYTIC DEPENDENCE W SLEEP DISORDER (HCC): ICD-10-CM

## 2021-03-01 RX ORDER — ZOLPIDEM TARTRATE 10 MG/1
TABLET ORAL
Qty: 90 TABLET | Refills: 0 | OUTPATIENT
Start: 2021-03-01

## 2021-03-01 RX ORDER — ZOLPIDEM TARTRATE 10 MG/1
10 TABLET ORAL NIGHTLY PRN
Qty: 90 TABLET | Refills: 1 | Status: SHIPPED | OUTPATIENT
Start: 2021-03-01 | End: 2021-06-17

## 2021-03-01 NOTE — TELEPHONE ENCOUNTER
Pharmacy phoned and stated script for the zolpidem has . Need new script every 6 months for that med. Last fill was 6/3/20 90 days 3 refills.  Last OV 20 no future OV

## 2021-03-02 ENCOUNTER — TELEPHONE (OUTPATIENT)
Dept: FAMILY MEDICINE CLINIC | Facility: CLINIC | Age: 76
End: 2021-03-02

## 2021-03-02 NOTE — TELEPHONE ENCOUNTER
Spoke to patient.  for 2 weeks off and on has had right lower quad abdominal pain. Normal bowel movements. No nausea or vomiting. Pain has now stayed all day. Rates #4 on 1/10 scale.  was able to complete her usual 3 mile walk with pain.

## 2021-03-03 ENCOUNTER — OFFICE VISIT (OUTPATIENT)
Dept: FAMILY MEDICINE CLINIC | Facility: CLINIC | Age: 76
End: 2021-03-03
Payer: MEDICARE

## 2021-03-03 VITALS
DIASTOLIC BLOOD PRESSURE: 68 MMHG | SYSTOLIC BLOOD PRESSURE: 130 MMHG | TEMPERATURE: 97 F | HEIGHT: 63 IN | WEIGHT: 154.19 LBS | HEART RATE: 60 BPM | OXYGEN SATURATION: 99 % | BODY MASS INDEX: 27.32 KG/M2

## 2021-03-03 DIAGNOSIS — N30.01 ACUTE CYSTITIS WITH HEMATURIA: Primary | ICD-10-CM

## 2021-03-03 DIAGNOSIS — R10.31 RIGHT LOWER QUADRANT ABDOMINAL PAIN: ICD-10-CM

## 2021-03-03 LAB
BILIRUBIN: NEGATIVE
GLUCOSE (URINE DIPSTICK): NEGATIVE MG/DL
KETONES (URINE DIPSTICK): NEGATIVE MG/DL
MULTISTIX LOT#: 1044 NUMERIC
NITRITE, URINE: NEGATIVE
PH, URINE: 6.5 (ref 4.5–8)
PROTEIN (URINE DIPSTICK): NEGATIVE MG/DL
SPECIFIC GRAVITY: 1.02 (ref 1–1.03)
URINE-COLOR: YELLOW
UROBILINOGEN,SEMI-QN: 0.2 MG/DL (ref 0–1.9)

## 2021-03-03 PROCEDURE — 87086 URINE CULTURE/COLONY COUNT: CPT | Performed by: FAMILY MEDICINE

## 2021-03-03 PROCEDURE — 3008F BODY MASS INDEX DOCD: CPT | Performed by: FAMILY MEDICINE

## 2021-03-03 PROCEDURE — 3075F SYST BP GE 130 - 139MM HG: CPT | Performed by: FAMILY MEDICINE

## 2021-03-03 PROCEDURE — 81003 URINALYSIS AUTO W/O SCOPE: CPT | Performed by: FAMILY MEDICINE

## 2021-03-03 PROCEDURE — 3078F DIAST BP <80 MM HG: CPT | Performed by: FAMILY MEDICINE

## 2021-03-03 PROCEDURE — 99214 OFFICE O/P EST MOD 30 MIN: CPT | Performed by: FAMILY MEDICINE

## 2021-03-03 RX ORDER — SULFAMETHOXAZOLE AND TRIMETHOPRIM 800; 160 MG/1; MG/1
1 TABLET ORAL 2 TIMES DAILY
Qty: 10 TABLET | Refills: 0 | Status: SHIPPED | OUTPATIENT
Start: 2021-03-03 | End: 2021-03-08

## 2021-03-03 NOTE — PROGRESS NOTES
Deshawnoriscamelia Civil is a 67year old female. HPI:       Abdominal pain:  Denies h/o kidney stones. Pain or discomfort 1/10, worse yesterday yesterday she woke with the pain. No frequency no pain or burning with urination.             Wt Readings from [place] Primary insomnia 8/6/2016   • Problems with swallowing 5 years ago   • Sedative, hypnotic or anxiolytic dependence w sleep [facility] ([facility] 4/29/2018   • Senile osteoporosis 8/19/2014   • Sensory hearing loss, bilateral 4/11/2016   • Sessile colonic polyp 12 09/01/2014  11/16/2015  10/13/2016                            10/10/2018  10/05/2019  09/06/2020      Fluzone Vaccine Medicare ()                          10/13/2016  10/05/2019      HEP A/HEP B Combined  01/30/2013 02/07/2013 02/27/2013      IPV Collection Time: 03/03/21  5:17 PM    Specimen: Urine, clean catch   Result Value Ref Range    Urine Culture No Growth at 18-24 hrs.             ASSESSMENT AND PLAN:       Acute cystitis with hematuria  (primary encounter diagnosis)  Right lower quadrant ab

## 2021-03-08 ENCOUNTER — TELEPHONE (OUTPATIENT)
Dept: FAMILY MEDICINE CLINIC | Facility: CLINIC | Age: 76
End: 2021-03-08

## 2021-03-08 DIAGNOSIS — R31.29 MICROSCOPIC HEMATURIA: Primary | ICD-10-CM

## 2021-03-08 DIAGNOSIS — R10.31 ACUTE ABDOMINAL PAIN IN RIGHT LOWER QUADRANT: ICD-10-CM

## 2021-03-08 NOTE — TELEPHONE ENCOUNTER
Pt states she was given antibiotics on 3/3 and was advised to call back if that didn't resolve her issue, pt states the antibiotics did not do the trick. what should she do next?

## 2021-03-08 NOTE — TELEPHONE ENCOUNTER
Patient states right lower abd pain remains. A little better but not gone. Rates it #3. Comes and goes. She said it sounds like diverticulitis but she has never been told she has diverticulosis or had diverticulitis in the past.  Please advise.

## 2021-03-08 NOTE — TELEPHONE ENCOUNTER
Chart reviewed. Colonoscopy from 61/43/2210 reports uncomplicated diverticulosis of descending colon and sigmoid colon but not of ascending colon. Patient complains of pain of right lower quadrant.   In office UA was positive for microscopic hematuria (sm

## 2021-03-11 ENCOUNTER — PATIENT MESSAGE (OUTPATIENT)
Dept: FAMILY MEDICINE CLINIC | Facility: CLINIC | Age: 76
End: 2021-03-11

## 2021-03-11 ENCOUNTER — HOSPITAL ENCOUNTER (OUTPATIENT)
Dept: CT IMAGING | Age: 76
Discharge: HOME | End: 2021-03-11
Attending: FAMILY MEDICINE
Payer: MEDICARE

## 2021-03-11 DIAGNOSIS — R10.31 ACUTE ABDOMINAL PAIN IN RIGHT LOWER QUADRANT: ICD-10-CM

## 2021-03-11 DIAGNOSIS — R31.29 MICROSCOPIC HEMATURIA: ICD-10-CM

## 2021-03-11 PROCEDURE — 74176 CT ABD & PELVIS W/O CONTRAST: CPT | Performed by: FAMILY MEDICINE

## 2021-03-12 NOTE — TELEPHONE ENCOUNTER
From: Hali Jackson  To: Ahmet Sal DO  Sent: 3/11/2021 5:28 PM CST  Subject: Test Results Question    My CT scan results are available. Please advise what is the next step.   Thank you,  Pavithra Sullivan

## 2021-03-15 ENCOUNTER — TELEPHONE (OUTPATIENT)
Dept: FAMILY MEDICINE CLINIC | Facility: CLINIC | Age: 76
End: 2021-03-15

## 2021-03-15 DIAGNOSIS — R31.29 MICROSCOPIC HEMATURIA: Primary | ICD-10-CM

## 2021-03-15 NOTE — TELEPHONE ENCOUNTER
From: Justin Goff  To: Tressa Aragon DO  Sent: 3/13/2021  8:10 AM CST  Subject: Test Results Question    I am requesting again an answer to the review of my CT scan. What are the implications of evidence of gall stones and what is the next step?

## 2021-03-16 ENCOUNTER — LAB ENCOUNTER (OUTPATIENT)
Dept: LAB | Age: 76
End: 2021-03-16
Attending: INTERNAL MEDICINE
Payer: MEDICARE

## 2021-03-16 ENCOUNTER — NURSE ONLY (OUTPATIENT)
Dept: FAMILY MEDICINE CLINIC | Facility: CLINIC | Age: 76
End: 2021-03-16
Payer: MEDICARE

## 2021-03-16 DIAGNOSIS — R31.29 MICROSCOPIC HEMATURIA: Primary | ICD-10-CM

## 2021-03-16 DIAGNOSIS — M81.0 SENILE OSTEOPOROSIS: ICD-10-CM

## 2021-03-16 DIAGNOSIS — Z78.0 POSTMENOPAUSAL: ICD-10-CM

## 2021-03-16 LAB
ALBUMIN SERPL-MCNC: 3.7 G/DL (ref 3.4–5)
ALBUMIN/GLOB SERPL: 1.1 {RATIO} (ref 1–2)
ALP LIVER SERPL-CCNC: 100 U/L
ALT SERPL-CCNC: 23 U/L
ANION GAP SERPL CALC-SCNC: 4 MMOL/L (ref 0–18)
APPEARANCE: YELLOW
AST SERPL-CCNC: 23 U/L (ref 15–37)
BILIRUB SERPL-MCNC: 0.3 MG/DL (ref 0.1–2)
BUN BLD-MCNC: 22 MG/DL (ref 7–18)
BUN/CREAT SERPL: 20.8 (ref 10–20)
CALCIUM BLD-MCNC: 9.7 MG/DL (ref 8.5–10.1)
CHLORIDE SERPL-SCNC: 106 MMOL/L (ref 98–112)
CO2 SERPL-SCNC: 29 MMOL/L (ref 21–32)
CREAT BLD-MCNC: 1.06 MG/DL
GLOBULIN PLAS-MCNC: 3.4 G/DL (ref 2.8–4.4)
GLUCOSE BLD-MCNC: 105 MG/DL (ref 70–99)
M PROTEIN MFR SERPL ELPH: 7.1 G/DL (ref 6.4–8.2)
MULTISTIX LOT#: 5077 NUMERIC
OSMOLALITY SERPL CALC.SUM OF ELEC: 292 MOSM/KG (ref 275–295)
PATIENT FASTING Y/N/NP: YES
PH, URINE: 6.5 (ref 4.5–8)
POTASSIUM SERPL-SCNC: 4 MMOL/L (ref 3.5–5.1)
PTH-INTACT SERPL-MCNC: 74.1 PG/ML (ref 18.5–88)
SODIUM SERPL-SCNC: 139 MMOL/L (ref 136–145)
SPECIFIC GRAVITY: 1.03 (ref 1–1.03)
URINE-COLOR: CLEAR
UROBILINOGEN,SEMI-QN: 0.2 MG/DL (ref 0–1.9)
VIT D+METAB SERPL-MCNC: 77.6 NG/ML (ref 30–100)

## 2021-03-16 PROCEDURE — 87086 URINE CULTURE/COLONY COUNT: CPT | Performed by: FAMILY MEDICINE

## 2021-03-16 PROCEDURE — 36415 COLL VENOUS BLD VENIPUNCTURE: CPT

## 2021-03-16 PROCEDURE — 80053 COMPREHEN METABOLIC PANEL: CPT

## 2021-03-16 PROCEDURE — 81003 URINALYSIS AUTO W/O SCOPE: CPT | Performed by: FAMILY MEDICINE

## 2021-03-16 PROCEDURE — 83970 ASSAY OF PARATHORMONE: CPT

## 2021-03-16 PROCEDURE — 82306 VITAMIN D 25 HYDROXY: CPT

## 2021-03-16 NOTE — TELEPHONE ENCOUNTER
Regarding the cholelithiasis/gallbladder stone(s), treatment with surgery for gallbladder stones is recommended when patients are symptomatic such as having right upper quadrant pain.   If patient is having right upper quadrant pain, she should let us know above for further details.      Dictated by (CST): Cornelia Malik MD on 3/11/2021 at 7:14 AM       Finalized by (CST): Cornelia Malik MD on 3/11/2021 at 7:17 AM

## 2021-03-17 NOTE — TELEPHONE ENCOUNTER
Please inform patient that she still has microscopic amount of blood in urine and leukocyte esterase which can be associated with a urinary tract infection.   Since the urine is still not normal and the CT scan was negative for a kidney stone, recommend pat

## 2021-03-17 NOTE — TELEPHONE ENCOUNTER
Relayed recommendations. She VU and agreed with plan. She requested referral info be sent to her mychart.

## 2021-06-17 ENCOUNTER — OFFICE VISIT (OUTPATIENT)
Dept: FAMILY MEDICINE CLINIC | Facility: CLINIC | Age: 76
End: 2021-06-17
Payer: MEDICARE

## 2021-06-17 VITALS
DIASTOLIC BLOOD PRESSURE: 60 MMHG | TEMPERATURE: 98 F | WEIGHT: 151 LBS | BODY MASS INDEX: 26.1 KG/M2 | SYSTOLIC BLOOD PRESSURE: 120 MMHG | HEIGHT: 63.86 IN

## 2021-06-17 DIAGNOSIS — Z79.899 ENCOUNTER FOR LONG-TERM CURRENT USE OF MEDICATION: ICD-10-CM

## 2021-06-17 DIAGNOSIS — H90.3 SENSORY HEARING LOSS, BILATERAL: ICD-10-CM

## 2021-06-17 DIAGNOSIS — Z12.31 ENCOUNTER FOR SCREENING MAMMOGRAM FOR MALIGNANT NEOPLASM OF BREAST: ICD-10-CM

## 2021-06-17 DIAGNOSIS — Z00.00 MEDICARE ANNUAL WELLNESS VISIT, SUBSEQUENT: Primary | ICD-10-CM

## 2021-06-17 DIAGNOSIS — Z92.29 HISTORY OF BISPHOSPHONATE THERAPY: ICD-10-CM

## 2021-06-17 DIAGNOSIS — J30.89 PERENNIAL ALLERGIC RHINITIS: ICD-10-CM

## 2021-06-17 DIAGNOSIS — Z78.0 POSTMENOPAUSAL: ICD-10-CM

## 2021-06-17 DIAGNOSIS — N28.9 RENAL INSUFFICIENCY: ICD-10-CM

## 2021-06-17 DIAGNOSIS — F13.282 SEDATIVE, HYPNOTIC OR ANXIOLYTIC DEPENDENCE W SLEEP DISORDER (HCC): ICD-10-CM

## 2021-06-17 DIAGNOSIS — E78.00 HYPERCHOLESTEROLEMIA: ICD-10-CM

## 2021-06-17 DIAGNOSIS — G47.00 INSOMNIA, UNSPECIFIED TYPE: ICD-10-CM

## 2021-06-17 DIAGNOSIS — M81.0 SENILE OSTEOPOROSIS: ICD-10-CM

## 2021-06-17 PROCEDURE — 3008F BODY MASS INDEX DOCD: CPT | Performed by: FAMILY MEDICINE

## 2021-06-17 PROCEDURE — 96160 PT-FOCUSED HLTH RISK ASSMT: CPT | Performed by: FAMILY MEDICINE

## 2021-06-17 PROCEDURE — 3074F SYST BP LT 130 MM HG: CPT | Performed by: FAMILY MEDICINE

## 2021-06-17 PROCEDURE — 3078F DIAST BP <80 MM HG: CPT | Performed by: FAMILY MEDICINE

## 2021-06-17 PROCEDURE — 99397 PER PM REEVAL EST PAT 65+ YR: CPT | Performed by: FAMILY MEDICINE

## 2021-06-17 PROCEDURE — G0439 PPPS, SUBSEQ VISIT: HCPCS | Performed by: FAMILY MEDICINE

## 2021-06-17 RX ORDER — LEVOCETIRIZINE DIHYDROCHLORIDE 5 MG/1
5 TABLET, FILM COATED ORAL EVERY EVENING
Qty: 90 TABLET | Refills: 3 | Status: SHIPPED | OUTPATIENT
Start: 2021-06-17 | End: 2021-09-13

## 2021-06-17 RX ORDER — ATORVASTATIN CALCIUM 10 MG/1
10 TABLET, FILM COATED ORAL NIGHTLY
Qty: 90 TABLET | Refills: 3 | Status: SHIPPED | OUTPATIENT
Start: 2021-06-17 | End: 2021-09-13

## 2021-06-17 RX ORDER — ZOLPIDEM TARTRATE 10 MG/1
10 TABLET ORAL NIGHTLY PRN
Qty: 90 TABLET | Refills: 3 | Status: SHIPPED | OUTPATIENT
Start: 2021-06-17 | End: 2021-08-23

## 2021-06-17 RX ORDER — ROMOSOZUMAB-AQQG 105 MG/1.17ML
105 INJECTION, SOLUTION SUBCUTANEOUS
COMMUNITY
Start: 2021-06-04

## 2021-06-17 NOTE — PROGRESS NOTES
HPI:   Hernando Teague is a 67year old female who presents for a MA (Medicare Advantage) [facility] (Once per calendar year).         Fall/Risk Assessment   She has been screened for Falls and is low risk: Fall/Risk Scorin    Cognitive Assessment Lab Results   Component Value Date    WBC 4.8 09/10/2020    HGB 14.2 09/10/2020    .0 09/10/2020        ALLERGIES:   She has No Known Allergies. CURRENT MEDICATIONS:   Romosozumab-aqqg (EVENITY) 105 MG/1. 17ML Subcutaneous Solution Prefilled Syr colonoscopy & polypectomy (12/17/14); upper gi endoscopy,exam; colonoscopy (2014); and total abdom hysterectomy (1995). Her family history includes ALS in her mother; Breast Cancer in her maternal aunt and paternal aunt;  Heart Attack in her maternal gra both ears   Nose:  Nose and throat not examined due to pandemic and patient does not have a nose or throat complaint   Throat:    Neck: Supple, symmetrical, trachea midline, no adenopathy;  thyroid: not enlarged, symmetric, no tenderness/mass/nodules unspecified type  Hypercholesterolemia  Perennial allergic rhinitis  Sensory hearing loss, bilateral  Postmenopausal  Senile osteoporosis  History of bisphosphonate therapy  Renal insufficiency  Encounter for long-term current use of medication  Encounter PANEL (8); Future    11. Encounter for long-term current use of medication  Medication use, risks, benefits, side effects and precautions discussed, patient verbalizes understanding. Questions encouraged and answered to patient's satisfaction.     - atorvas TSH+FREE T4; Future  -     atorvastatin 10 MG Oral Tab; Take 1 tablet (10 mg total) by mouth nightly. Perennial allergic rhinitis  -     CBC WITH DIFFERENTIAL WITH PLATELET; Future  -     Levocetirizine Dihydrochloride 5 MG Oral Tab;  Take 1 tablet (5 mg but may not be covered, or covered at this frequency, by your insurer. Please check with your insurance carrier before scheduling to verify coverage.    PREVENTATIVE SERVICES FREQUENCY &  COVERAGE DETAILS LAST COMPLETION DATE   Diabetes Screening    Fasti normal risk;  Annually if at high risk -  No recommendations at this time    Chlamydia Annually if high risk -  No recommendations at this time   Screening Mammogram    Mammogram     Recommend annually for all female patients aged 36 and older    One baseli

## 2021-06-17 NOTE — PATIENT INSTRUCTIONS
Tracey Negron's SCREENING SCHEDULE   Tests on this list are recommended by your physician but may not be covered, or covered at this frequency, by your insurer. Please check with your insurance carrier before scheduling to verify coverage.    PREVENT 09/03/2020      No recommendations at this time   Pap and Pelvic    Pap   Covered every 2 years for women at normal risk;  Annually if at high risk -  No recommendations at this time    Chlamydia Annually if high risk -  No recommendations at this time   Sc Advance Directives.

## 2021-07-14 ENCOUNTER — HOSPITAL ENCOUNTER (OUTPATIENT)
Dept: MAMMOGRAPHY | Age: 76
Discharge: HOME | End: 2021-07-14
Attending: FAMILY MEDICINE
Payer: MEDICARE

## 2021-07-14 DIAGNOSIS — Z12.31 ENCOUNTER FOR SCREENING MAMMOGRAM FOR MALIGNANT NEOPLASM OF BREAST: ICD-10-CM

## 2021-07-14 PROCEDURE — 77063 BREAST TOMOSYNTHESIS BI: CPT | Performed by: FAMILY MEDICINE

## 2021-07-14 PROCEDURE — 77067 SCR MAMMO BI INCL CAD: CPT | Performed by: FAMILY MEDICINE

## 2021-08-04 ENCOUNTER — LAB ENCOUNTER (OUTPATIENT)
Dept: LAB | Age: 76
End: 2021-08-04
Attending: FAMILY MEDICINE
Payer: MEDICARE

## 2021-08-04 DIAGNOSIS — N28.9 RENAL INSUFFICIENCY: ICD-10-CM

## 2021-08-04 DIAGNOSIS — J30.89 PERENNIAL ALLERGIC RHINITIS: ICD-10-CM

## 2021-08-04 DIAGNOSIS — E78.00 HYPERCHOLESTEROLEMIA: ICD-10-CM

## 2021-08-04 LAB
ANION GAP SERPL CALC-SCNC: 6 MMOL/L (ref 0–18)
BASOPHILS # BLD AUTO: 0.02 X10(3) UL (ref 0–0.2)
BASOPHILS NFR BLD AUTO: 0.4 %
BUN BLD-MCNC: 15 MG/DL (ref 7–18)
CALCIUM BLD-MCNC: 9.3 MG/DL (ref 8.5–10.1)
CHLORIDE SERPL-SCNC: 110 MMOL/L (ref 98–112)
CHOLEST SMN-MCNC: 171 MG/DL (ref ?–200)
CO2 SERPL-SCNC: 26 MMOL/L (ref 21–32)
CREAT BLD-MCNC: 0.77 MG/DL
EOSINOPHIL # BLD AUTO: 0.09 X10(3) UL (ref 0–0.7)
EOSINOPHIL NFR BLD AUTO: 1.7 %
ERYTHROCYTE [DISTWIDTH] IN BLOOD BY AUTOMATED COUNT: 12.8 %
GLUCOSE BLD-MCNC: 86 MG/DL (ref 70–99)
HCT VFR BLD AUTO: 42.8 %
HDLC SERPL-MCNC: 54 MG/DL (ref 40–59)
HGB BLD-MCNC: 13.3 G/DL
IMM GRANULOCYTES # BLD AUTO: 0.02 X10(3) UL (ref 0–1)
IMM GRANULOCYTES NFR BLD: 0.4 %
LDLC SERPL CALC-MCNC: 97 MG/DL (ref ?–100)
LYMPHOCYTES # BLD AUTO: 1.78 X10(3) UL (ref 1–4)
LYMPHOCYTES NFR BLD AUTO: 33.6 %
MCH RBC QN AUTO: 30 PG (ref 26–34)
MCHC RBC AUTO-ENTMCNC: 31.1 G/DL (ref 31–37)
MCV RBC AUTO: 96.6 FL
MONOCYTES # BLD AUTO: 0.55 X10(3) UL (ref 0.1–1)
MONOCYTES NFR BLD AUTO: 10.4 %
NEUTROPHILS # BLD AUTO: 2.83 X10 (3) UL (ref 1.5–7.7)
NEUTROPHILS # BLD AUTO: 2.83 X10(3) UL (ref 1.5–7.7)
NEUTROPHILS NFR BLD AUTO: 53.5 %
NONHDLC SERPL-MCNC: 117 MG/DL (ref ?–130)
OSMOLALITY SERPL CALC.SUM OF ELEC: 294 MOSM/KG (ref 275–295)
PATIENT FASTING Y/N/NP: YES
PATIENT FASTING Y/N/NP: YES
PLATELET # BLD AUTO: 193 10(3)UL (ref 150–450)
POTASSIUM SERPL-SCNC: 4 MMOL/L (ref 3.5–5.1)
RBC # BLD AUTO: 4.43 X10(6)UL
SODIUM SERPL-SCNC: 142 MMOL/L (ref 136–145)
T4 FREE SERPL-MCNC: 0.9 NG/DL (ref 0.8–1.7)
TRIGL SERPL-MCNC: 109 MG/DL (ref 30–149)
TSI SER-ACNC: 1.74 MIU/ML (ref 0.36–3.74)
VLDLC SERPL CALC-MCNC: 18 MG/DL (ref 0–30)
WBC # BLD AUTO: 5.3 X10(3) UL (ref 4–11)

## 2021-08-04 PROCEDURE — 80048 BASIC METABOLIC PNL TOTAL CA: CPT

## 2021-08-04 PROCEDURE — 85025 COMPLETE CBC W/AUTO DIFF WBC: CPT

## 2021-08-04 PROCEDURE — 84439 ASSAY OF FREE THYROXINE: CPT

## 2021-08-04 PROCEDURE — 36415 COLL VENOUS BLD VENIPUNCTURE: CPT

## 2021-08-04 PROCEDURE — 80061 LIPID PANEL: CPT

## 2021-08-04 PROCEDURE — 84443 ASSAY THYROID STIM HORMONE: CPT

## 2021-08-22 DIAGNOSIS — Z79.899 ENCOUNTER FOR LONG-TERM CURRENT USE OF MEDICATION: ICD-10-CM

## 2021-08-22 DIAGNOSIS — G47.00 INSOMNIA, UNSPECIFIED TYPE: ICD-10-CM

## 2021-08-22 DIAGNOSIS — F13.282 SEDATIVE, HYPNOTIC OR ANXIOLYTIC DEPENDENCE W SLEEP DISORDER (HCC): ICD-10-CM

## 2021-08-23 RX ORDER — ZOLPIDEM TARTRATE 10 MG/1
TABLET ORAL
Qty: 90 TABLET | Refills: 0 | Status: SHIPPED | OUTPATIENT
Start: 2021-08-23

## 2021-08-23 NOTE — TELEPHONE ENCOUNTER
Zolpidem Tartrate 10 MG Oral Tab 90 tablet 3 6/17/2021    Sig:   Take 1 tablet (10 mg total) by mouth nightly as needed for Sleep. at bedtime       LOV 6/17/21 wellness visit.    No future OV

## 2021-09-11 DIAGNOSIS — E78.00 HYPERCHOLESTEROLEMIA: ICD-10-CM

## 2021-09-11 DIAGNOSIS — Z79.899 ENCOUNTER FOR LONG-TERM CURRENT USE OF MEDICATION: ICD-10-CM

## 2021-09-11 DIAGNOSIS — J30.89 PERENNIAL ALLERGIC RHINITIS: ICD-10-CM

## 2021-09-12 NOTE — TELEPHONE ENCOUNTER
Pt requesting refill of ATORVASTATIN 10 MG Oral Tab passed protocol , refill approved, sent to pharmacy
hand grasp, leg strength strong and equal bilaterally

## 2021-09-13 RX ORDER — ATORVASTATIN CALCIUM 10 MG/1
TABLET, FILM COATED ORAL
Qty: 90 TABLET | Refills: 3 | Status: SHIPPED | OUTPATIENT
Start: 2021-09-13

## 2021-09-13 RX ORDER — LEVOCETIRIZINE DIHYDROCHLORIDE 5 MG/1
TABLET, FILM COATED ORAL
Qty: 90 TABLET | Refills: 3 | Status: SHIPPED | OUTPATIENT
Start: 2021-09-13

## 2021-09-13 NOTE — TELEPHONE ENCOUNTER
Pt requesting refill of LEVO CETIRIZINE and ATORVASTATIN   Passed protocol, refill approved, sent to pharmacy:     Last Office Visit with Provider: 6/17/21  No future appointments.

## 2022-01-14 ENCOUNTER — TELEPHONE (OUTPATIENT)
Dept: FAMILY MEDICINE CLINIC | Facility: CLINIC | Age: 77
End: 2022-01-14

## 2022-01-14 NOTE — TELEPHONE ENCOUNTER
Patient states her bottle of zolpidem says she has 3 refills. Advised we do not have that info. Advised her to call her pharmacy.

## 2022-01-14 NOTE — TELEPHONE ENCOUNTER
Pt called and said she is confused with the label on her medication bottle vs what her chart says. She would like the nurse to call her to help her figure it out. It's for the Zolpidem 10mg.

## 2022-03-03 PROBLEM — N28.9 RENAL INSUFFICIENCY: Status: RESOLVED | Noted: 2021-06-17 | Resolved: 2022-03-03

## 2022-03-03 PROBLEM — I70.0 AORTO-ILIAC ATHEROSCLEROSIS: Status: ACTIVE | Noted: 2022-03-03

## 2022-03-03 PROBLEM — I70.8 AORTO-ILIAC ATHEROSCLEROSIS (HCC): Status: ACTIVE | Noted: 2022-03-03

## 2022-03-03 PROBLEM — I70.8 AORTO-ILIAC ATHEROSCLEROSIS: Status: ACTIVE | Noted: 2022-03-03

## 2022-03-03 PROBLEM — I70.0 AORTO-ILIAC ATHEROSCLEROSIS (HCC): Status: ACTIVE | Noted: 2022-03-03

## 2022-03-03 RX ORDER — ZOLPIDEM TARTRATE 10 MG/1
TABLET ORAL
Qty: 90 TABLET | Refills: 0 | OUTPATIENT
Start: 2022-03-03

## 2022-06-07 ENCOUNTER — TELEPHONE (OUTPATIENT)
Dept: FAMILY MEDICINE CLINIC | Facility: CLINIC | Age: 77
End: 2022-06-07

## 2022-06-07 NOTE — TELEPHONE ENCOUNTER
Pt called back to state that her current PCP is Dr. Pola Ramsey and declined to schedule an appt The pt will contact ins to update PCP

## 2022-06-07 NOTE — TELEPHONE ENCOUNTER
Pt called for [facility] Supervisit. Upon chart review, pt is seeing a different PCP. Dr. Jesus[facility] Cushing.      [facility]

## 2022-08-04 ENCOUNTER — LAB ENCOUNTER (OUTPATIENT)
Dept: LAB | Age: 77
End: 2022-08-04
Attending: INTERNAL MEDICINE
Payer: MEDICARE

## 2022-08-04 DIAGNOSIS — M81.8 IDIOPATHIC OSTEOPOROSIS: Primary | ICD-10-CM

## 2022-08-04 DIAGNOSIS — E78.00 HYPERCHOLESTEROLEMIA: ICD-10-CM

## 2022-08-04 LAB
ALBUMIN SERPL-MCNC: 4 G/DL (ref 3.4–5)
ALBUMIN/GLOB SERPL: 1.3 {RATIO} (ref 1–2)
ALP LIVER SERPL-CCNC: 78 U/L
ALT SERPL-CCNC: 19 U/L
ANION GAP SERPL CALC-SCNC: 4 MMOL/L (ref 0–18)
AST SERPL-CCNC: 21 U/L (ref 15–37)
BILIRUB SERPL-MCNC: 0.8 MG/DL (ref 0.1–2)
BUN BLD-MCNC: 19 MG/DL (ref 7–18)
CALCIUM BLD-MCNC: 9.5 MG/DL (ref 8.5–10.1)
CHLORIDE SERPL-SCNC: 110 MMOL/L (ref 98–112)
CHOLEST SERPL-MCNC: 169 MG/DL (ref ?–200)
CO2 SERPL-SCNC: 29 MMOL/L (ref 21–32)
CREAT BLD-MCNC: 0.88 MG/DL
FASTING PATIENT LIPID ANSWER: YES
FASTING STATUS PATIENT QL REPORTED: YES
GFR SERPLBLD BASED ON 1.73 SQ M-ARVRAT: 67 ML/MIN/1.73M2 (ref 60–?)
GLOBULIN PLAS-MCNC: 3.1 G/DL (ref 2.8–4.4)
GLUCOSE BLD-MCNC: 91 MG/DL (ref 70–99)
HDLC SERPL-MCNC: 58 MG/DL (ref 40–59)
LDLC SERPL CALC-MCNC: 98 MG/DL (ref ?–100)
NONHDLC SERPL-MCNC: 111 MG/DL (ref ?–130)
OSMOLALITY SERPL CALC.SUM OF ELEC: 298 MOSM/KG (ref 275–295)
POTASSIUM SERPL-SCNC: 3.9 MMOL/L (ref 3.5–5.1)
PROT SERPL-MCNC: 7.1 G/DL (ref 6.4–8.2)
PTH-INTACT SERPL-MCNC: 53.7 PG/ML (ref 18.5–88)
SODIUM SERPL-SCNC: 143 MMOL/L (ref 136–145)
TRIGL SERPL-MCNC: 66 MG/DL (ref 30–149)
VIT D+METAB SERPL-MCNC: 54.6 NG/ML (ref 30–100)
VLDLC SERPL CALC-MCNC: 11 MG/DL (ref 0–30)

## 2022-08-04 PROCEDURE — 82306 VITAMIN D 25 HYDROXY: CPT

## 2022-08-04 PROCEDURE — 80053 COMPREHEN METABOLIC PANEL: CPT

## 2022-08-04 PROCEDURE — 80061 LIPID PANEL: CPT

## 2022-08-04 PROCEDURE — 83970 ASSAY OF PARATHORMONE: CPT

## 2022-08-04 PROCEDURE — 36415 COLL VENOUS BLD VENIPUNCTURE: CPT

## (undated) DIAGNOSIS — F13.282 SEDATIVE, HYPNOTIC OR ANXIOLYTIC DEPENDENCE W SLEEP DISORDER (HCC): ICD-10-CM

## (undated) DIAGNOSIS — G47.00 INSOMNIA, UNSPECIFIED TYPE: ICD-10-CM

## (undated) NOTE — MR AVS SNAPSHOT
[facility]  [phone]               Thank you for choosing us for your health care visit with Leanne Guajardo DO.   We are glad to serve you and happy to provide you with this s Clinical breast exam by your provider: at least every 3 years if you are 21to 44years old and every year if you are 36 years or older   Mammogram: as often as your healthcare provider recommends if you are 22years old or older.  When you should start hav if you have a high risk of sexually transmitted disease (STD)   Gonorrhea and syphilis tests: if you are at high risk for these infections, including if you have a new sex partner or more than 1 partner, a history of STDs, a partner with an STD, or a partn babies are most susceptible to complications from whooping cough, Tdap is especially recommended for adults caring for children, even if it has been less than 10 years since your last tetanus booster.    Flu shot every fall if you are 50 or older, you have grains, fruits, and vegetables in your diet. Get regular physical activity or exercise. Injury prevention: Use lap and shoulder belts when you drive. Use a helmet when you ride a motorcycle or bicycle.  If you are around guns or other firearms, practice s Collards   179 mg/1/2 cup   Swiss cheese   272 mg/1 oz.   White beans, cooked   81 mg/1/2 cup   English muffin, whole wheat   175 mg/1 muffin   Cheddar cheese   205 mg/1 oz.   Navy beans, cooked   79 mg/1/2 cup   Kale   90 mg/1/2 cup   Ice cream strawberry These medications were sent to [facility] [address], [address], [phone], [phone]  [address] [facility] [address]     Phone:  [phone]    - Atorvastatin Calcium 10 MG Tabs